# Patient Record
Sex: FEMALE | Race: WHITE | Employment: OTHER | URBAN - METROPOLITAN AREA
[De-identification: names, ages, dates, MRNs, and addresses within clinical notes are randomized per-mention and may not be internally consistent; named-entity substitution may affect disease eponyms.]

---

## 2019-06-20 PROCEDURE — 87624 HPV HI-RISK TYP POOLED RSLT: CPT | Performed by: OBSTETRICS & GYNECOLOGY

## 2019-06-20 PROCEDURE — 88175 CYTOPATH C/V AUTO FLUID REDO: CPT | Performed by: OBSTETRICS & GYNECOLOGY

## 2021-04-15 PROCEDURE — 88305 TISSUE EXAM BY PATHOLOGIST: CPT | Performed by: INTERNAL MEDICINE

## 2021-11-22 PROBLEM — M62.838 SPASM OF RIGHT PIRIFORMIS MUSCLE: Status: ACTIVE | Noted: 2021-11-22

## 2025-06-20 RX ORDER — PROGESTERONE 100 MG/1
100 CAPSULE ORAL NIGHTLY
COMMUNITY

## 2025-06-20 RX ORDER — UBIDECARENONE/VIT E/VIT E MIX 100-20-15
400 CAPSULE ORAL DAILY
COMMUNITY

## 2025-07-14 ENCOUNTER — EKG ENCOUNTER (OUTPATIENT)
Dept: LAB | Age: 75
End: 2025-07-14
Attending: ORTHOPAEDIC SURGERY
Payer: COMMERCIAL

## 2025-07-14 ENCOUNTER — LABORATORY ENCOUNTER (OUTPATIENT)
Dept: LAB | Age: 75
End: 2025-07-14
Attending: ORTHOPAEDIC SURGERY
Payer: COMMERCIAL

## 2025-07-14 DIAGNOSIS — M16.11 OSTEOARTHRITIS OF RIGHT HIP: ICD-10-CM

## 2025-07-14 LAB
ANION GAP SERPL CALC-SCNC: 8 MMOL/L (ref 0–18)
ANTIBODY SCREEN: NEGATIVE
ATRIAL RATE: 75 BPM
BUN BLD-MCNC: 15 MG/DL (ref 9–23)
CALCIUM BLD-MCNC: 9.8 MG/DL (ref 8.7–10.6)
CHLORIDE SERPL-SCNC: 101 MMOL/L (ref 98–112)
CO2 SERPL-SCNC: 32 MMOL/L (ref 21–32)
CREAT BLD-MCNC: 0.81 MG/DL (ref 0.55–1.02)
EGFRCR SERPLBLD CKD-EPI 2021: 76 ML/MIN/1.73M2 (ref 60–?)
ERYTHROCYTE [DISTWIDTH] IN BLOOD BY AUTOMATED COUNT: 14.8 %
GLUCOSE BLD-MCNC: 97 MG/DL (ref 70–99)
HCT VFR BLD AUTO: 40.6 % (ref 35–48)
HGB BLD-MCNC: 13.7 G/DL (ref 12–16)
MCH RBC QN AUTO: 28.1 PG (ref 26–34)
MCHC RBC AUTO-ENTMCNC: 33.7 G/DL (ref 31–37)
MCV RBC AUTO: 83.2 FL (ref 80–100)
OSMOLALITY SERPL CALC.SUM OF ELEC: 293 MOSM/KG (ref 275–295)
P AXIS: 55 DEGREES
P-R INTERVAL: 182 MS
PLATELET # BLD AUTO: 234 10(3)UL (ref 150–450)
POTASSIUM SERPL-SCNC: 3.4 MMOL/L (ref 3.5–5.1)
Q-T INTERVAL: 386 MS
QRS DURATION: 84 MS
QTC CALCULATION (BEZET): 431 MS
R AXIS: -14 DEGREES
RBC # BLD AUTO: 4.88 X10(6)UL (ref 3.8–5.3)
RH BLOOD TYPE: POSITIVE
SODIUM SERPL-SCNC: 141 MMOL/L (ref 136–145)
T AXIS: 18 DEGREES
VENTRICULAR RATE: 75 BPM
WBC # BLD AUTO: 8.3 X10(3) UL (ref 4–11)

## 2025-07-14 PROCEDURE — 93010 ELECTROCARDIOGRAM REPORT: CPT | Performed by: INTERNAL MEDICINE

## 2025-07-14 PROCEDURE — 87081 CULTURE SCREEN ONLY: CPT

## 2025-07-14 PROCEDURE — 36415 COLL VENOUS BLD VENIPUNCTURE: CPT

## 2025-07-14 PROCEDURE — 80048 BASIC METABOLIC PNL TOTAL CA: CPT

## 2025-07-14 PROCEDURE — 85027 COMPLETE CBC AUTOMATED: CPT

## 2025-07-14 PROCEDURE — 86900 BLOOD TYPING SEROLOGIC ABO: CPT

## 2025-07-14 PROCEDURE — 86850 RBC ANTIBODY SCREEN: CPT

## 2025-07-14 PROCEDURE — 86901 BLOOD TYPING SEROLOGIC RH(D): CPT

## 2025-07-14 PROCEDURE — 93005 ELECTROCARDIOGRAM TRACING: CPT

## 2025-07-18 NOTE — H&P (VIEW-ONLY)
Patient ID: Shayna Owens     Chief Complaint:    Right hip pain    HPI:    Shayna Owens is a 75 year old female who presents today for right hip pain. Patient states pain started years ago and worse in the last year. Pain is severe and described as aching, grinding, and sharp, and inability to walk and groin pain. Pain is located in a C around the hip. Factors that aggravate symptoms include: getting in and out of car, putting shoes and socks on activity, stair climbing, getting up from a chair.  Weight Bearing: without asssit.  Patient denies any numbness, tingles, or radicular symptoms. Patient denies any signs of infection including fever or chills.  Patient states that the symptoms are getting progressively worse.  The pain is affecting their quality of life, ability to sleep at night, ability to perform activities and daily living and to ambulate.  Pain with walking and stair climbing.  They report they do walk with a limp.   They do have some low back pain.  Patient does note stiffness in the hip.  Patient has taken an anti-inflammatory for months. She has tried formal PT and acupuncture as well with no relief.  Patient does not walk with a cane.  Patient does not take pain medications. Pain has continued and here to discuss surgical options.         Physical Exam:     Vital Signs:  There were no vitals taken for this visit.    Past Medical History:   Diagnosis Date   • Agatston coronary artery calcium score less than 100 2013    2013 score 12; 2015 score 19   • Degenerative joint disease (DJD) of lumbar spine    • Family history of colon cancer     mother age 67   • Hip pain, right 12/23/2013    resolved after back surgery   • History of adenomatous polyp of colon 2002   • History of spinal fusion 2016    Decreased sensation right leg   • Hyperlipidemia    • Hypertension    • Injury of left rotator cuff 2023 05/17/2023   • Osteoarthritis of left knee 05/27/2025   • Other age-related cataract  05/17/2023   • Primary osteoarthritis of right hip 07/02/2025   • Pyriformis syndrome, right 05/22/2024   • Seafood allergy 05/22/2024    Carries epipen and Benadryl     • Spasm of right piriformis muscle; 11/2021 11/22/2021   • Visual impairment      Past Surgical History:   Procedure Laterality Date   • BACK SURGERY  1/7/16    L4-S1 TLIF    • COLONOSCOPY FLX DX W/COLLJ SPEC WHEN PFRMD  7/8/05    hemorrhoids   • COLONOSCOPY FLX DX W/COLLJ SPEC WHEN PFRMD  2002    adenomatous polyp   • COLONOSCOPY FLX DX W/COLLJ SPEC WHEN PFRMD  12/17/15    Hyperplastic polyp   • COLONOSCOPY FLX DX W/COLLJ SPEC WHEN PFRMD  04/15/2021    ascending polyp   • COLONOSCOPY W/BIOPSY SINGLE/MULTIPLE  10/21/2010    hyperplastic polyp, Performed by ZACHARIAH ASHFORD at [facility]   • COLONOSCOPY W/BIOPSY SINGLE/MULTIPLE N/A 12/17/2015    Procedure: COLONOSCOPY, POSSIBLE BIOPSY, POSSIBLE POLYPECTOMY 13344;  Surgeon: Zachariah Ashford MD;  Location: [facility]   • CYST ASPIRATION LEFT  1990's   • OTHER SURGICAL HISTORY  1992    cyst aspiration on left breast     Current Outpatient Medications   Medication Sig Dispense Refill   • Lovastatin 20 MG Oral Tab Take 1 tablet (20 mg total) by mouth nightly. 90 tablet 3   • Potassium Chloride ER 20 MEQ Oral Tab CR Take 1 tablet by mouth daily. 90 tablet 3   • Irbesartan 150 MG Oral Tab Take 1 tablet (150 mg total) by mouth 2 (two) times daily. 180 tablet 3   • hydroCHLOROthiazide 50 MG Oral Tab Take 1 tablet (50 mg total) by mouth daily. 90 tablet 3   • EPINEPHrine (EPIPEN 2-ALBERTO) 0.3 MG/0.3ML Injection Solution Auto-injector As directed 2 each 2   • PROGESTERONE 100 MG Oral Cap TAKE 1 CAPSULE BY MOUTH NIGHTLY 90 capsule 3   • estradiol 0.05 MG/24HR Transdermal Patch Biweekly Place 1 patch onto the skin twice a week. 24 patch 3   • benzonatate 200 MG Oral Cap Take 1 capsule (200 mg total) by mouth 3 (three) times daily as needed. 30 capsule 2   • Scopolamine 1.5mg TD patch  1mg/3days Place 1 patch onto the skin every third day. 4 patch 1   • meclizine 25 MG Oral Tab Take 1 tablet (25 mg total) by mouth every 6 (six) hours as needed. 20 tablet 0   • olopatadine 0.1 % Ophthalmic Solution      • ALREX 0.2 % Ophthalmic Suspension      • Loratadine (CLARITIN OR) Take 1 tablet by mouth daily. CLARITIN     • Flaxseed, Linseed, (FLAX SEED OIL) 1000 MG Oral Cap Take 1 capsule by mouth daily.     • VITAMIN D 1000 UNIT OR TABS Take by mouth.     • MULTI-VITAMIN/MINERALS OR TABS          Pcn [Bicillin L-A]      RASH  Propoxyphene Hcl        HALLUCINATION  Fish-Derived Produc*    HIVES  Levaquin [Levofloxa*        Comment:Has had knee pain and elbow pain  Lotensin [Benazepri*        Comment:TROUBLE SWALLOWING  Seafood                     Comment:HEAD CONGESTION,TROUBLE SWALLOWING  Shellfish Allergy       UNKNOWN    Comment:HEAD CONGESTION,TROUBLE SWALLOWING  Clarithromycin          RASH  Social History    Tobacco Use      Smoking status: Never      Smokeless tobacco: Never    Alcohol use: Yes      Comment: minimal    Family History   Problem Relation Age of Onset   • Colon Cancer Mother    • Heart Attack Father    • Other (MVP) Father    • High Cholesterol Sister    • Diabetes Maternal Aunt    • Other (Retinitis pigmentosa) Maternal Aunt    • Colon Cancer Paternal Aunt    • Colon Cancer Paternal Cousin Female        ROS:     All other pertinent positives and negatives as noted above in HPI.    Physical Exam  Vital Signs:  There were no vitals taken for this visit.  Estimated body mass index is 38.08 kg/m² as calculated from the following:    Height as of 7/2/25: 5' (1.524 m).    Weight as of 7/2/25: 195 lb (88.5 kg).    antalgic gait  without assistive device    Right hip: Limited internal (5 degrees) and external rotation (15 degrees) with pain in the groin region, Limited abduction (0-15/20) degrees, adduction (15 degrees) and flexion ( -5-95 degrees)         5/5 strength  5/5 knee flexion and  extension  5/5 ankle dorsiflexion and plantarflexion  Sensation grossly intact in thigh, leg and foot  Negative Trendelenburg sign  Negative Stinchfield sign  2+ pedal pulses and brisk capillary refill  No skin rashes or lesion noted    Left hip: Limited internal (20 degrees) and external rotation (30 degrees) with no pain in the groin region, Limited abduction (0-15/20) degrees, adduction (15 degrees) and flexion ( -5-95 degrees)      5/5 strength  5/5 knee flexion and extension  5/5 ankle dorsiflexion and plantarflexion  Sensation grossly intact in thigh, leg and foot  Negative Trendelenburg sign  Negative Stinchfield sign  2+ pedal pulses and brisk capillary refill  No skin rashes or lesions noted    Back:  Deformity: no  Pelvic obliquity: no  Tenderness: no        Radiographs:    Imaging was personally and individually reviewed and discussed at length with the patient:      Two views of the right hip(s) were reviewed in the office today, including AP pelvis and lateral views.  There is severe joint space narrowing (bone-on-bone) with large osteophyte formation off the acetabulum and the head neck junction.  There is sclerosis noted in the femoral head and acetabulum. There is no fracture or dislocation.  No periosteal reactions or medullary lesions are seen.      Hgb: 13.7  Cr: .81  GFR: 76  MSSA   MRSA: negative          Assessment:     right hip osteoarthritis            Plan:       Continuation of conservative management vs. MIGUEL discussed.  The patient wishes to proceed with right total hip replacement.      One or more of the conservative treatments have been tried and failed for three months or more except in special circumstances where delay of definitive care is not appropriate: non steroid anti-infammatory medication(s), physical therapy, and activity modification, but steroid unlikely to provide any sustained relief give severity.      Risk and benefits of surgery were reviewed.  Including, but not  limited to, blood clots, anesthesia risk, infection, leg length discrepancy, failure of the implant, need for future surgeries, continued pain, hematoma, need for transfusion, and death, among others.  The patient understands and wishes to proceed.     The spectrum of treatment options were discussed with the patient in detail including both the nonoperative and operative treatment modalities and their respective risks and benefits.  After thorough discussion, the patient has elected to undergo surgical treatment.  The details of the surgical procedure were explained including the location of probable incisions and a description of the likely implants to be used.  Models and diagrams were used as educational resources. The patient understands the likely convalescence after surgery, as well as the rehabilitation required.  We thoroughly discussed the risks, benefits, and alternatives to surgery.  The risks include but are not limited to the risk of infection, joint stiffness, blood clots (including DVT and/or pulmonary embolus along with the risk of death), neurologic and/or vascular injury, fracture, dislocation, nonunion, malunion, need for further surgery including hardware failure requiring revision, and continued pain.  It was explained that if tissue has been repaired or reconstructed, there is also a chance of failure which may require further management.  In addition, we have discussed the risks, including the risk of disease transmission, associated with any allograft tissue which may be utilized.  Following the completion of the discussion, the patient expressed understanding of this planned course of care, all their questions were answered and consent will be obtained preoperatively.    The risks, benefits and alternatives of surgery were discussed with the patient including, but not limited to:   You may be allergic to the medicine that you get during surgery.   The nerves or tendons around your hip may  be hurt during surgery.   You may bleed more than expected, requiring blood transfusion.  You may get an infection which will require additional surgery and possibly removal of all implants to cure. Patients with diabetes or who are on certain medications to suppress the immune system are particularly at risk for infection. Patients over 40 BMI are at significant risk for infection and wound healing problems.  You could have a fracture during surgery, or within several weeks after surgery. Patients with osteopenia or osteoporosis are at increased risk for fracture during and after surgery.  You may have problems with your heart and/or kidneys. Patients with history of heart disease are at increased risk for cardiac complications and some of the medications used during and after surgery may affect kidney function.  After surgery, your hip may be stiff and painful. Your hip and knee may swell. This usually lasts about 6 weeks and may be permanent.  Your legs may not be the same length.   Your implant may get loose or move out of place causing pain.  The implant may get worn out over time and need to be replaced.   After having surgery, you may lose your balance and be more likely to fall for a time.   You can dislocate your hip which would require an additional procedure and potentially surgery to correct.  You may get a blood clot in your leg or arm. This can cause pain and swelling, and it can stop blood from flowing where it needs to go in your body. The blood clot can break loose and travel to your lungs or brain. A blood clot in your lungs can cause chest pain, trouble breathing and possibly death. A blood clot in your brain can cause a stroke. These problems can be life-threatening.       Pain medication discussed.    R MIGUEL at [facility]    no smoking  no diabetes - Last A1c value was 4.9% done 5/12/2023.  BMI - 39  no VILLA  no hx of infections/MRSA/dental/joint  no hx of blood clots   no blood thinner  yes lumbar  surgery L4-S1 fusion  PCN allergy - rash  No Cardiac history  No Pulmonary history       - risks, benefits and alternatives discussed  - labs reviewed and meds given   - proceed with right total hip arthroplasty    Diagnoses and all orders for this visit:    Primary osteoarthritis of right hip

## 2025-07-19 ENCOUNTER — ANESTHESIA EVENT (OUTPATIENT)
Dept: SURGERY | Facility: HOSPITAL | Age: 75
End: 2025-07-19
Payer: COMMERCIAL

## 2025-07-23 NOTE — DISCHARGE INSTRUCTIONS
Sometimes managing your health at home requires assistance.  The [facility] team has recognized your preference to use [facility].  They can be reached by phone at [phone].  The fax number for your reference is [phone].  A representative from the home health agency will contact you or your family to schedule your first visit.     Weight bearing restrictions:    Weight bearing as tolerated (WBAT) ? you may put as much of your weight on your leg as you can tolerate. This means you may use a walker, crutches, cane- or no device at all to get around.      Bathing  No tub baths, pools, or saunas until cleared by surgeon (about 4-6 weeks because it takes that long for the incision(s) on the skin to heal and be a barrier to prevent infection.)    When allowed to shower:  AQUACEL dressing is waterproof and does not require being covered before showering.  Pat dressing(s) and surrounding skin dry after shower.   There may be one incision or a few that have a dressing.                                      AQUACEL           MEDIPORE/COVERLET           DRY STERILE GAUZE                                                                                                                         MEDIPORE/COVERLET dressing and dry sterile gauze are NOT waterproof and REQUIRE being covered with a waterproof barrier to keep the dressing and incision dry.  SARAN WRAP, GLAD WRAP, PRESS N SEAL WORK REALLY WELL BUT ANY PLASTIC WRAP WILL DO.  Do not wash incision.   Remove entire wrapping and old dressing (if Medipore/coverlet or gauze) after showering. Pat dry with a CLEAN TOWEL if necessary and cover incision with new Medipore/coverlet or gauze.    Incision care/Dressing changes  Wash hands before and after dressing changes.  There may be one or a few dressings on the hip/leg    FOR MEDIPORE/COVERLET DRESSINGS:  Change dressing daily using Medipore/coverlet once Aquacel (waterproof) dressing is removed  (which is about 7 days after surgery). Patient should be standing or lying flat so dressing goes on smoothly.  (This dressing needed for hip patients because of location of incision-don’t want contamination from bathroom use)  FOR DRY STERILE GAUZE DRESSINGS:   Change dressing daily using dry sterile gauze and paper tape.  Watch ALL incision for any redness, drainage, increased warmth or opening of the incision.   Call surgeon if you notice any of these.  No lotions or ointments on or near incision(s).                             DRY STERILE GAUZE                         MEDIPORE /COVERLET    Continue dressing changes until your first visit with your surgeon’s office.  There could be a small amount of redness around the staples or incision; this is normal.  Watch for increased redness, warmth, any odor, increased drainage or opening of the incision. A little clear yellow or blood tinged drainage is normal up to 2 weeks after surgery but it should be less every day until it stops.  Call physician if you notice any concerning changes.  Sutures/staples will be removed at first office visit (10 days- 3 weeks).       Driving  Do not drive until cleared by surgeon. Possibly six weeks after surgery. Discuss this at follow-up office visit.   Not allowed while taking narcotic pain medication or muscle relaxants.    Sex  Usually allowed after six weeks - check with surgeon at your office visit.    Return to work  Usually allowed after six weeks. Discuss specific work activities with your surgeon.    Restrictions  Follow instructions provided by physical therapy    No smoking  Avoid smoking. It is known to cause breathing problems and can decrease the rate of healing.                      Medication  Anticoagulants = blood thinners (Xarelto, Eliquis, Lovenox, Coumadin or Aspirin)  Pill or shot form depending on what your physician orders.   IF placed on Coumadin, you may also need lab work done for monitoring.  You will bleed  easier and bruise easier while on these medications.   Usually you will be on a blood thinner for about 4-5 weeks after surgery.  Contact your physician if you have signs of bruising, nose bleeds or blood in your urine. Use electric razors and soft toothbrushes only.  Do not take NSAIDS (non-steroidal anti-inflammatory medications) while taking blood thinners unless ordered by your surgeon.  Review anticoagulant education information sheet provided.    Discomfort  Surgical discomfort is normal for one to two months.  Have realistic goals and keep a positive outlook.  Keep pain manageable; pain should not disrupt your sleep or activities like getting out of bed or walking.  You may need pain medication regularly (every 4-6 hours) the first 2 weeks and then begin to decrease how often you are taking it.  Take pain medication as prescribed with food, especially before therapy, allowing 30-60 minutes to take effect.  Do not drink alcohol while on pain medication.  As you have less discomfort, decrease the amount of pain medication you take. Use plain Tylenol (acetaminophen) for less severe pain.  Some pain medications have Tylenol (acetaminophen) in them such as Norco and Percocet. Do NOT take Tylenol (acetaminophen) within 4 hours of a dose of these medications.  Apply ice or cold therapy to surgical site for 20 minutes at least four times a day, especially after therapy. Be sure there is a thin cloth barrier between skin and ice or cold therapy.  Change position at least every 45 minutes while awake to avoid stiffness or increased discomfort.  Deep breathing and relaxation techniques and distractions can help!  If you focus on something else, you do not experience the pain the same. Take advantage of everything available to your to help control you discomfort.  Contact physician if discomfort does not respond to pain medication.    Body changes  Constipation is common with the use of narcotics.  Eat fiber rich foods  and drink plenty of fluids, at least 4-6 glasses of water a day, unless restricted.  To prevent constipation, use stool softeners such as Colace and laxatives such as Miralax, Senakot or Milk of Magnesia while taking laxatives.   An enema or suppository may be needed if above measures do not work.    Prevention of infection and promotion of healing  Good hand washing is important. Everyone should wash their hands or use hand  as soon as they walk in your house-whether they live there or are visiting.  Keep bed linen/clothing freshly laundered.  Do not allow others or pets to touch your incision.  Avoid people that have colds or the flu.  Your surgeon may recommend that you take antibiotics before you undergo any dental or other invasive surgical procedures after your hip fracture surgery. Speak with your surgeon about this at your post-op office visit.  Continue using incentive spirometry because narcotics make you sleepy so you may not take good deep breaths. We do not want you to get pneumonia.     Post op Office visits  Schedule 10 days to 3 weeks after surgery WITH SURGEON’s office.  Additional visits may need to be scheduled. Your physician will discuss this at first post-op office visit.  Schedule outpatient physical therapy per your surgeon’s orders.  Schedule one week follow up after surgery WITH PRIMARY CARE PHYSICIAN; review your medications over last 6 months.  Your body gets stressed by surgery and that stress can affect all your other health issues (such as high blood pressure, diabetes, CHF, afib, and asthma just to name a few).  It is much better to catch developing problems and prevent them from becoming larger ones.  BALJINDER HOSE - IF ordered by your surgeon, wear these during the day and off at night.  Surgeon will tell you when you don’t need them anymore.    Notify your surgeon if you notice any of the following signs  Separation of incision line.  Increased redness, swelling, or warmth of  skin around incision.  Increased or foul smelling drainage from incision  Red streaks on skin near incision.  Temperature >100.4F.  Increased pain at incision not relieved by pain medication.    Signs of Possible Hip Dislocation IF Total Hip Replacement or Andrei-arthroplasty Done  Increased severe leg or groin pain  Turning in or out of surgical leg that is new  Increased numbness, tingling to leg  Inability to walk or put weight on your surgical leg  Signs of blood clot  Pain, excessive tenderness, redness, or swelling in leg or calf (other than incision site).  Call physician and await their directions.    Go directly to the ER or CALL 911 if you:  become short of breath  have chest pain  cough up blood  have unexplained anxiety with breathing     Traveling and Handicapped parking  Check with you surgeon when you are allowed to travel so you don’t set yourself up for greater chance of complications.  If traveling by car, get out to stretch every 2 hours.  This helps prevent stiffness.  You may need to do this any time you travel for the first year after surgery.  If traveling by plane, BEFORE you get into a security line, let them know that you had your hip replaced, as you will most likely set off the metal detector. The doctors no longer provide an identification card for this as they are easily copied. ALSO request a wheelchair the first year to board and get off a plane…this aids in priority seating and you should sit on the aisle or at the bulkhead where you can easily stretch your legs and get up to walk up and down the aisles…this helps prevent blood clots and stiffness.  TEMPORARY HANDICAP PARKING APPLICATION  (good for 3-6 months)  - At Surgeon or PCP visit, request they fill out their part of the form. You fill our your portion, then go to Department of [employer]. Some [facility] offices provide the same service. ([place] and [place] have this service; if you live in another [facility], you  may check with them as well). You need space to open car doors to position yourself properly with walker to get in and out of your car safely; some parking spaces are  practically on top of each other and do not give you enough room.    Spanda- hip replacement(single layer compression sleeve):     All patients should wear the compression sleeve until first follow up visit to surgeon’s office (about 2-3 weeks) and then check with surgeon if need to continue use.  Take off to shower. Best to keep on as much as possible, even at night, except when washing out.  Wash with mild soap and water; DRIP dry overnight- put back on before getting up for the day.  Use one long tube on the calf.   It should end up just below the back of the knee and the other end on the ball of the foot to expose the toes.   Makes sure it is NOT bunched up anywhere.  IF the Spanda- feels TOO tight, then REMOVE it and call your surgeon to let them know.

## 2025-07-24 ENCOUNTER — HOSPITAL ENCOUNTER (OUTPATIENT)
Facility: HOSPITAL | Age: 75
Discharge: HOME HEALTH | End: 2025-07-25
Attending: ORTHOPAEDIC SURGERY | Admitting: ORTHOPAEDIC SURGERY

## 2025-07-24 ENCOUNTER — APPOINTMENT (OUTPATIENT)
Dept: GENERAL RADIOLOGY | Facility: HOSPITAL | Age: 75
End: 2025-07-24
Attending: ORTHOPAEDIC SURGERY

## 2025-07-24 ENCOUNTER — ANESTHESIA (OUTPATIENT)
Dept: SURGERY | Facility: HOSPITAL | Age: 75
End: 2025-07-24
Payer: COMMERCIAL

## 2025-07-24 DIAGNOSIS — M16.11 OSTEOARTHRITIS OF RIGHT HIP: Primary | ICD-10-CM

## 2025-07-24 LAB — RH BLOOD TYPE: POSITIVE

## 2025-07-24 PROCEDURE — 97116 GAIT TRAINING THERAPY: CPT

## 2025-07-24 PROCEDURE — 88311 DECALCIFY TISSUE: CPT | Performed by: ORTHOPAEDIC SURGERY

## 2025-07-24 PROCEDURE — 97161 PT EVAL LOW COMPLEX 20 MIN: CPT

## 2025-07-24 PROCEDURE — 88304 TISSUE EXAM BY PATHOLOGIST: CPT | Performed by: ORTHOPAEDIC SURGERY

## 2025-07-24 PROCEDURE — 76000 FLUOROSCOPY <1 HR PHYS/QHP: CPT | Performed by: ORTHOPAEDIC SURGERY

## 2025-07-24 PROCEDURE — 72170 X-RAY EXAM OF PELVIS: CPT | Performed by: ORTHOPAEDIC SURGERY

## 2025-07-24 DEVICE — IMPLANTABLE DEVICE: Type: IMPLANTABLE DEVICE | Site: HIP | Status: FUNCTIONAL

## 2025-07-24 RX ORDER — DIPHENHYDRAMINE HYDROCHLORIDE 50 MG/ML
25 INJECTION, SOLUTION INTRAMUSCULAR; INTRAVENOUS ONCE AS NEEDED
Status: ACTIVE | OUTPATIENT
Start: 2025-07-24 | End: 2025-07-24

## 2025-07-24 RX ORDER — FERROUS SULFATE 325(65) MG
325 TABLET, DELAYED RELEASE (ENTERIC COATED) ORAL
Status: DISCONTINUED | OUTPATIENT
Start: 2025-07-25 | End: 2025-07-25

## 2025-07-24 RX ORDER — PHENYLEPHRINE HCL 10 MG/ML
VIAL (ML) INJECTION AS NEEDED
Status: DISCONTINUED | OUTPATIENT
Start: 2025-07-24 | End: 2025-07-24 | Stop reason: SURG

## 2025-07-24 RX ORDER — HYDROMORPHONE HYDROCHLORIDE 1 MG/ML
0.4 INJECTION, SOLUTION INTRAMUSCULAR; INTRAVENOUS; SUBCUTANEOUS EVERY 2 HOUR PRN
Status: DISCONTINUED | OUTPATIENT
Start: 2025-07-24 | End: 2025-07-25

## 2025-07-24 RX ORDER — FAMOTIDINE 10 MG/ML
20 INJECTION, SOLUTION INTRAVENOUS 2 TIMES DAILY
Status: DISCONTINUED | OUTPATIENT
Start: 2025-07-24 | End: 2025-07-25

## 2025-07-24 RX ORDER — GLYCOPYRROLATE 0.2 MG/ML
INJECTION, SOLUTION INTRAMUSCULAR; INTRAVENOUS AS NEEDED
Status: DISCONTINUED | OUTPATIENT
Start: 2025-07-24 | End: 2025-07-24 | Stop reason: SURG

## 2025-07-24 RX ORDER — SENNOSIDES 8.6 MG
17.2 TABLET ORAL NIGHTLY
Status: DISCONTINUED | OUTPATIENT
Start: 2025-07-24 | End: 2025-07-25

## 2025-07-24 RX ORDER — TRAMADOL HYDROCHLORIDE 50 MG/1
50 TABLET ORAL EVERY 6 HOURS SCHEDULED
Status: DISCONTINUED | OUTPATIENT
Start: 2025-07-24 | End: 2025-07-25

## 2025-07-24 RX ORDER — SODIUM CHLORIDE, SODIUM LACTATE, POTASSIUM CHLORIDE, CALCIUM CHLORIDE 600; 310; 30; 20 MG/100ML; MG/100ML; MG/100ML; MG/100ML
INJECTION, SOLUTION INTRAVENOUS CONTINUOUS
Status: DISCONTINUED | OUTPATIENT
Start: 2025-07-24 | End: 2025-07-25

## 2025-07-24 RX ORDER — DIPHENHYDRAMINE HYDROCHLORIDE 50 MG/ML
12.5 INJECTION, SOLUTION INTRAMUSCULAR; INTRAVENOUS EVERY 4 HOURS PRN
Status: DISCONTINUED | OUTPATIENT
Start: 2025-07-24 | End: 2025-07-25

## 2025-07-24 RX ORDER — PRAVASTATIN SODIUM 20 MG
20 TABLET ORAL NIGHTLY
Status: DISCONTINUED | OUTPATIENT
Start: 2025-07-24 | End: 2025-07-25

## 2025-07-24 RX ORDER — METOCLOPRAMIDE HYDROCHLORIDE 5 MG/ML
INJECTION INTRAMUSCULAR; INTRAVENOUS
Status: COMPLETED
Start: 2025-07-24 | End: 2025-07-24

## 2025-07-24 RX ORDER — ACETAMINOPHEN 325 MG/1
650 TABLET ORAL
Status: DISCONTINUED | OUTPATIENT
Start: 2025-07-24 | End: 2025-07-25

## 2025-07-24 RX ORDER — HYDROMORPHONE HYDROCHLORIDE 1 MG/ML
0.4 INJECTION, SOLUTION INTRAMUSCULAR; INTRAVENOUS; SUBCUTANEOUS EVERY 5 MIN PRN
Status: DISCONTINUED | OUTPATIENT
Start: 2025-07-24 | End: 2025-07-24 | Stop reason: HOSPADM

## 2025-07-24 RX ORDER — DOCUSATE SODIUM 100 MG/1
100 CAPSULE, LIQUID FILLED ORAL 2 TIMES DAILY
Status: DISCONTINUED | OUTPATIENT
Start: 2025-07-24 | End: 2025-07-25

## 2025-07-24 RX ORDER — HYDROCHLOROTHIAZIDE 25 MG/1
50 TABLET ORAL DAILY
Status: DISCONTINUED | OUTPATIENT
Start: 2025-07-25 | End: 2025-07-25

## 2025-07-24 RX ORDER — LOSARTAN POTASSIUM 50 MG/1
50 TABLET ORAL DAILY
Status: DISCONTINUED | OUTPATIENT
Start: 2025-07-25 | End: 2025-07-25

## 2025-07-24 RX ORDER — NEOSTIGMINE METHYLSULFATE 1 MG/ML
INJECTION INTRAVENOUS AS NEEDED
Status: DISCONTINUED | OUTPATIENT
Start: 2025-07-24 | End: 2025-07-24 | Stop reason: SURG

## 2025-07-24 RX ORDER — DIPHENHYDRAMINE HCL 25 MG
25 CAPSULE ORAL EVERY 4 HOURS PRN
Status: DISCONTINUED | OUTPATIENT
Start: 2025-07-24 | End: 2025-07-25

## 2025-07-24 RX ORDER — ASPIRIN 81 MG/1
81 TABLET ORAL 2 TIMES DAILY
Status: DISCONTINUED | OUTPATIENT
Start: 2025-07-24 | End: 2025-07-25

## 2025-07-24 RX ORDER — PROGESTERONE 100 MG/1
100 CAPSULE ORAL NIGHTLY
Status: DISCONTINUED | OUTPATIENT
Start: 2025-07-24 | End: 2025-07-25

## 2025-07-24 RX ORDER — ROCURONIUM BROMIDE 10 MG/ML
INJECTION, SOLUTION INTRAVENOUS AS NEEDED
Status: DISCONTINUED | OUTPATIENT
Start: 2025-07-24 | End: 2025-07-24 | Stop reason: SURG

## 2025-07-24 RX ORDER — HYDROMORPHONE HYDROCHLORIDE 1 MG/ML
0.2 INJECTION, SOLUTION INTRAMUSCULAR; INTRAVENOUS; SUBCUTANEOUS EVERY 5 MIN PRN
Status: DISCONTINUED | OUTPATIENT
Start: 2025-07-24 | End: 2025-07-24 | Stop reason: HOSPADM

## 2025-07-24 RX ORDER — CELECOXIB 200 MG/1
200 CAPSULE ORAL DAILY
COMMUNITY

## 2025-07-24 RX ORDER — HYDROMORPHONE HYDROCHLORIDE 1 MG/ML
0.2 INJECTION, SOLUTION INTRAMUSCULAR; INTRAVENOUS; SUBCUTANEOUS EVERY 2 HOUR PRN
Status: DISCONTINUED | OUTPATIENT
Start: 2025-07-24 | End: 2025-07-25

## 2025-07-24 RX ORDER — NALOXONE HYDROCHLORIDE 0.4 MG/ML
0.08 INJECTION, SOLUTION INTRAMUSCULAR; INTRAVENOUS; SUBCUTANEOUS AS NEEDED
Status: DISCONTINUED | OUTPATIENT
Start: 2025-07-24 | End: 2025-07-24 | Stop reason: HOSPADM

## 2025-07-24 RX ORDER — LIDOCAINE HYDROCHLORIDE 10 MG/ML
INJECTION, SOLUTION EPIDURAL; INFILTRATION; INTRACAUDAL; PERINEURAL AS NEEDED
Status: DISCONTINUED | OUTPATIENT
Start: 2025-07-24 | End: 2025-07-24 | Stop reason: SURG

## 2025-07-24 RX ORDER — HYDROMORPHONE HYDROCHLORIDE 1 MG/ML
INJECTION, SOLUTION INTRAMUSCULAR; INTRAVENOUS; SUBCUTANEOUS
Status: COMPLETED
Start: 2025-07-24 | End: 2025-07-24

## 2025-07-24 RX ORDER — OXYCODONE HYDROCHLORIDE 5 MG/1
TABLET ORAL EVERY 4 HOURS PRN
COMMUNITY

## 2025-07-24 RX ORDER — METOCLOPRAMIDE HYDROCHLORIDE 5 MG/ML
10 INJECTION INTRAMUSCULAR; INTRAVENOUS EVERY 8 HOURS PRN
Status: DISCONTINUED | OUTPATIENT
Start: 2025-07-24 | End: 2025-07-25

## 2025-07-24 RX ORDER — ACETAMINOPHEN 500 MG
1000 TABLET ORAL ONCE
Status: DISCONTINUED | OUTPATIENT
Start: 2025-07-24 | End: 2025-07-24 | Stop reason: HOSPADM

## 2025-07-24 RX ORDER — PREGABALIN 75 MG/1
75 CAPSULE ORAL DAILY
COMMUNITY

## 2025-07-24 RX ORDER — METOCLOPRAMIDE HYDROCHLORIDE 5 MG/ML
10 INJECTION INTRAMUSCULAR; INTRAVENOUS ONCE
Status: COMPLETED | OUTPATIENT
Start: 2025-07-24 | End: 2025-07-24

## 2025-07-24 RX ORDER — LABETALOL HYDROCHLORIDE 5 MG/ML
5 INJECTION, SOLUTION INTRAVENOUS EVERY 5 MIN PRN
Status: DISCONTINUED | OUTPATIENT
Start: 2025-07-24 | End: 2025-07-24 | Stop reason: HOSPADM

## 2025-07-24 RX ORDER — HYDROMORPHONE HYDROCHLORIDE 1 MG/ML
0.6 INJECTION, SOLUTION INTRAMUSCULAR; INTRAVENOUS; SUBCUTANEOUS EVERY 5 MIN PRN
Status: DISCONTINUED | OUTPATIENT
Start: 2025-07-24 | End: 2025-07-24 | Stop reason: HOSPADM

## 2025-07-24 RX ORDER — TRANEXAMIC ACID 10 MG/ML
1000 INJECTION, SOLUTION INTRAVENOUS ONCE
Status: COMPLETED | OUTPATIENT
Start: 2025-07-24 | End: 2025-07-24

## 2025-07-24 RX ORDER — POLYETHYLENE GLYCOL 3350 17 G/17G
17 POWDER, FOR SOLUTION ORAL DAILY PRN
Status: DISCONTINUED | OUTPATIENT
Start: 2025-07-24 | End: 2025-07-25

## 2025-07-24 RX ORDER — MIDAZOLAM HYDROCHLORIDE 1 MG/ML
INJECTION INTRAMUSCULAR; INTRAVENOUS AS NEEDED
Status: DISCONTINUED | OUTPATIENT
Start: 2025-07-24 | End: 2025-07-24 | Stop reason: SURG

## 2025-07-24 RX ORDER — SODIUM CHLORIDE, SODIUM LACTATE, POTASSIUM CHLORIDE, CALCIUM CHLORIDE 600; 310; 30; 20 MG/100ML; MG/100ML; MG/100ML; MG/100ML
INJECTION, SOLUTION INTRAVENOUS CONTINUOUS
Status: DISCONTINUED | OUTPATIENT
Start: 2025-07-24 | End: 2025-07-24 | Stop reason: HOSPADM

## 2025-07-24 RX ORDER — TRAMADOL HYDROCHLORIDE 50 MG/1
50 TABLET ORAL
COMMUNITY

## 2025-07-24 RX ORDER — AMOXICILLIN 250 MG
1 CAPSULE ORAL DAILY
COMMUNITY

## 2025-07-24 RX ORDER — HYDROCODONE BITARTRATE AND ACETAMINOPHEN 5; 325 MG/1; MG/1
2 TABLET ORAL ONCE AS NEEDED
Status: DISCONTINUED | OUTPATIENT
Start: 2025-07-24 | End: 2025-07-24 | Stop reason: HOSPADM

## 2025-07-24 RX ORDER — DEXAMETHASONE SODIUM PHOSPHATE 4 MG/ML
VIAL (ML) INJECTION AS NEEDED
Status: DISCONTINUED | OUTPATIENT
Start: 2025-07-24 | End: 2025-07-24 | Stop reason: SURG

## 2025-07-24 RX ORDER — ONDANSETRON 2 MG/ML
INJECTION INTRAMUSCULAR; INTRAVENOUS AS NEEDED
Status: DISCONTINUED | OUTPATIENT
Start: 2025-07-24 | End: 2025-07-24 | Stop reason: SURG

## 2025-07-24 RX ORDER — ACETAMINOPHEN 500 MG
1000 TABLET ORAL ONCE AS NEEDED
Status: DISCONTINUED | OUTPATIENT
Start: 2025-07-24 | End: 2025-07-24 | Stop reason: HOSPADM

## 2025-07-24 RX ORDER — ASPIRIN 81 MG/1
81 TABLET ORAL 2 TIMES DAILY
COMMUNITY

## 2025-07-24 RX ORDER — DEXAMETHASONE SODIUM PHOSPHATE 10 MG/ML
8 INJECTION, SOLUTION INTRAMUSCULAR; INTRAVENOUS ONCE
Status: DISCONTINUED | OUTPATIENT
Start: 2025-07-25 | End: 2025-07-25

## 2025-07-24 RX ORDER — ONDANSETRON 2 MG/ML
4 INJECTION INTRAMUSCULAR; INTRAVENOUS EVERY 6 HOURS PRN
Status: DISCONTINUED | OUTPATIENT
Start: 2025-07-24 | End: 2025-07-25

## 2025-07-24 RX ORDER — HYDROCODONE BITARTRATE AND ACETAMINOPHEN 5; 325 MG/1; MG/1
1 TABLET ORAL ONCE AS NEEDED
Status: DISCONTINUED | OUTPATIENT
Start: 2025-07-24 | End: 2025-07-24 | Stop reason: HOSPADM

## 2025-07-24 RX ORDER — ACETAMINOPHEN 500 MG
1000 TABLET ORAL EVERY 8 HOURS
COMMUNITY

## 2025-07-24 RX ORDER — KETOROLAC TROMETHAMINE 15 MG/ML
15 INJECTION, SOLUTION INTRAMUSCULAR; INTRAVENOUS EVERY 6 HOURS
Status: DISCONTINUED | OUTPATIENT
Start: 2025-07-24 | End: 2025-07-25

## 2025-07-24 RX ORDER — SODIUM PHOSPHATE, DIBASIC AND SODIUM PHOSPHATE, MONOBASIC 7; 19 G/230ML; G/230ML
1 ENEMA RECTAL ONCE AS NEEDED
Status: DISCONTINUED | OUTPATIENT
Start: 2025-07-24 | End: 2025-07-25

## 2025-07-24 RX ORDER — BISACODYL 10 MG
10 SUPPOSITORY, RECTAL RECTAL
Status: DISCONTINUED | OUTPATIENT
Start: 2025-07-24 | End: 2025-07-25

## 2025-07-24 RX ORDER — FAMOTIDINE 20 MG/1
20 TABLET, FILM COATED ORAL 2 TIMES DAILY
Status: DISCONTINUED | OUTPATIENT
Start: 2025-07-24 | End: 2025-07-25

## 2025-07-24 RX ORDER — OXYCODONE HYDROCHLORIDE 5 MG/1
5 TABLET ORAL EVERY 4 HOURS PRN
Status: DISCONTINUED | OUTPATIENT
Start: 2025-07-24 | End: 2025-07-25

## 2025-07-24 RX ADMIN — NEOSTIGMINE METHYLSULFATE 4 MG: 1 INJECTION INTRAVENOUS at 09:28:00

## 2025-07-24 RX ADMIN — SODIUM CHLORIDE, SODIUM LACTATE, POTASSIUM CHLORIDE, CALCIUM CHLORIDE: 600; 310; 30; 20 INJECTION, SOLUTION INTRAVENOUS at 07:04:00

## 2025-07-24 RX ADMIN — DEXAMETHASONE SODIUM PHOSPHATE 4 MG: 4 MG/ML VIAL (ML) INJECTION at 07:20:00

## 2025-07-24 RX ADMIN — PHENYLEPHRINE HCL 100 MCG: 10 MG/ML VIAL (ML) INJECTION at 07:32:00

## 2025-07-24 RX ADMIN — PHENYLEPHRINE HCL 100 MCG: 10 MG/ML VIAL (ML) INJECTION at 08:26:00

## 2025-07-24 RX ADMIN — PHENYLEPHRINE HCL 100 MCG: 10 MG/ML VIAL (ML) INJECTION at 08:43:00

## 2025-07-24 RX ADMIN — TRANEXAMIC ACID 1000 MG: 10 INJECTION, SOLUTION INTRAVENOUS at 07:32:00

## 2025-07-24 RX ADMIN — GLYCOPYRROLATE 0.8 MG: 0.2 INJECTION, SOLUTION INTRAMUSCULAR; INTRAVENOUS at 09:28:00

## 2025-07-24 RX ADMIN — MIDAZOLAM HYDROCHLORIDE 2 MG: 1 INJECTION INTRAMUSCULAR; INTRAVENOUS at 07:09:00

## 2025-07-24 RX ADMIN — SODIUM CHLORIDE, SODIUM LACTATE, POTASSIUM CHLORIDE, CALCIUM CHLORIDE: 600; 310; 30; 20 INJECTION, SOLUTION INTRAVENOUS at 09:40:00

## 2025-07-24 RX ADMIN — ROCURONIUM BROMIDE 50 MG: 10 INJECTION, SOLUTION INTRAVENOUS at 07:09:00

## 2025-07-24 RX ADMIN — PHENYLEPHRINE HCL 100 MCG: 10 MG/ML VIAL (ML) INJECTION at 09:10:00

## 2025-07-24 RX ADMIN — LIDOCAINE HYDROCHLORIDE 50 MG: 10 INJECTION, SOLUTION EPIDURAL; INFILTRATION; INTRACAUDAL; PERINEURAL at 07:09:00

## 2025-07-24 RX ADMIN — ONDANSETRON 4 MG: 2 INJECTION INTRAMUSCULAR; INTRAVENOUS at 09:03:00

## 2025-07-24 RX ADMIN — SODIUM CHLORIDE, SODIUM LACTATE, POTASSIUM CHLORIDE, CALCIUM CHLORIDE: 600; 310; 30; 20 INJECTION, SOLUTION INTRAVENOUS at 09:06:00

## 2025-07-24 NOTE — PLAN OF CARE
Patient is alert and oriented x 4. Vitals stable on room air. Patient reporting mild pain managed by scheduled meds. Denies numbness & tingling. Dressing c/d/I to RLE. Plan for PT/OT. Plan of care discussed with patient.

## 2025-07-24 NOTE — CONSULTS
[facility]   part of [facility]    History & Physical    Shayna Owens Patient Status:  Outpatient in a Bed    1950 MRN [id number]   Location [facility] 3SW-A Attending Thai Penaloza MD   Hosp Day # 0 PCP Adam Manzo MD     Date:  2025  Date of Admission:  2025    Chief Complaint:     - OA  - s/p R MIGUEL    HPI:   Shayna Owens is a(n) 75 year old female w/ PMHx of eHTN, HLD, OA, DJD s/p prior spinal fusion who presented for R MIGUEL. Pt is s/p R anterior MIGUEL w/ Dr. Penaloza.     Post op has some pain but manageable, she reports she mostly just took tylenol after spine surgery     She reports no cp, palps, dyspnea, escobar, orthopnea, le edema, she is ambulating      History   Past Medical History[1]  Past Surgical History[2]  Family History[3]  Social History:  Short Social Hx on File[4]    Allergies/Medications:   Allergies: Allergies[5]  Prescriptions Prior to Admission[6]    Review of Systems:   A comprehensive 12 point review of systems was otherwise negative, aside from what's stated above.    Physical Exam:   Vital Signs:  Blood pressure 144/88, pulse 98, temperature 98.1 °F (36.7 °C), temperature source Oral, resp. rate 16, height 5' (1.524 m), weight 202 lb (91.6 kg), SpO2 94%, not currently breastfeeding.     General: No acute distress. Alert and oriented x 3.  HEENT: Moist mucous membranes. EOM-I. PERRL  Neck: No lymphadenopathy.  No JVD. No carotid bruits.  Respiratory: CTAB, no wheezing   Cardiovascular: RRR, no murmurs   Abdomen: Soft, nontender, nondistended.  Positive bowel sounds. No rebound tenderness  Neurologic: No focal neurological deficits.  Musculoskeletal: right sided dressing c/d/i  Integument: No lesions. No erythema.  Psychiatric: Appropriate mood and affect.    Results:     Lab Results   Component Value Date    WBC 8.3 2025    HGB 13.7 2025    HCT 40.6 2025    .0 2025    CREATSERUM 0.81 2025    BUN 15  07/14/2025     07/14/2025    K 3.4 (L) 07/14/2025     07/14/2025    CO2 32.0 07/14/2025    GLU 97 07/14/2025    CA 9.8 07/14/2025    ALB 4.1 04/08/2021    ALKPHO 55 04/08/2021    BILT 0.49 04/08/2021    TP 6.8 04/08/2021    AST 15 04/08/2021    ALT 11 04/08/2021    PTT 31.7 12/09/2015    INR 0.97 12/09/2015    TSH 2.410 08/08/2014          XR PELVIS (1 VIEW) (CPT=72170)  Result Date: 7/24/2025  CONCLUSION: Postoperative changes of right total hip arthroplasty without regional fracture, malalignment, or evidence of hardware complication. Small volume of regional soft tissue gas noted. Mild osteoarthritis of the left hip. Electronically Verified and Signed by Attending Radiologist: Lucita Marshall MD 7/24/2025 12:03 PM Workstation: [id number]    XR FLUOROSCOPY C-ARM TIME LESS THAN 1 HOUR (CPT=76000)  Result Date: 7/24/2025  CONCLUSION: Multiple fluoroscopic images acquired during right total hip arthroplasty procedure. Please refer to dedicated procedure report for full details. Electronically Verified and Signed by Attending Radiologist: Lucita Marshall MD 7/24/2025 10:01 AM Workstation: [id number]          Assessment/Plan:     Shayna Owens is a(n) 75 year old female w/ PMHx of eHTN, HLD, OA, DJD s/p prior spinal fusion who presented for R MIGUEL. Pt is s/p R anterior MIGUEL w/ Dr. Penaloza.     OA  S/p R MIGUEL w/ Dr. Penaloza   - post op mgmt per ortho     eHTN  - losartan in place of home irbesartan - okay to start tomorrow w/ hold parameters  - cont home thiazide tomorrow   - check chem panel tomorrow  - replace lytes prn     HLD  - statin     DVT ppx: aspirin bid per ortho   Code Status:     Dispo: will follow     Time spent 46min    DO NAOMIE Figueroa Hospitalist                 [1]   Past Medical History:   Agatston coronary artery calcium score less than 100    2013 score 12; 2015 score 19    Degenerative joint disease (DJD) of lumbar spine    Family history of colon cancer    mother age 67     High blood pressure    High cholesterol    Hip pain, right    resolved after back surgery    History of adenomatous polyp of colon    History of spinal fusion    Decreased sensation right leg    Hyperlipidemia    Hypertension    Migraines    ocular migraine    Osteoarthritis    general    Spasm of right piriformis muscle; 11/2021    Visual impairment    prescription glasses   [2]   Past Surgical History:  Procedure Laterality Date    Back surgery  1/7/16    L4-S1 TLIF     Colonoscopy,biopsy  10/21/2010    hyperplastic polyp, Performed by ZACHARIAH ASHFORD at [facility]    Colonoscopy,biopsy N/A 12/17/2015    Procedure: COLONOSCOPY, POSSIBLE BIOPSY, POSSIBLE POLYPECTOMY 83654;  Surgeon: Zachariah Ashford MD;  Location: [facility]    Colonoscopy,diagnostic  7/8/05    hemorrhoids    Colonoscopy,diagnostic  2002    adenomatous polyp    Colonoscopy,diagnostic  12/17/15    Hyperplastic polyp    Colonoscopy,diagnostic  04/15/2021    ascending polyp    Cyst aspiration left  1990's    Other surgical history  1992    cyst aspiration on left breast   [3]   Family History  Problem Relation Age of Onset    Heart Attack Father     Other (MVP) Father     Colon Cancer Mother     High Cholesterol Sister     Colon Cancer Paternal Aunt     Diabetes Maternal Aunt     Colon Cancer Paternal Cousin Female    [4]   Social History  Socioeconomic History    Marital status:    Tobacco Use    Smoking status: Never    Smokeless tobacco: Never   Vaping Use    Vaping status: Never Used   Substance and Sexual Activity    Alcohol use: Yes     Alcohol/week: 0.0 standard drinks of alcohol     Comment: occ. 2 beers a month    Drug use: No    Sexual activity: Yes     Partners: Male   [5]   Allergies  Allergen Reactions    Fish-Derived Products HIVES     Olfactory - gets sick just by smell    Pcn [Bicillin L-A] RASH    Propoxyphene Hcl HALLUCINATION    Levaquin [Levofloxacin]      Has had knee pain and elbow pain     Lotensin [Benazepril Hcl]      TROUBLE SWALLOWING      Seafood OTHER (SEE COMMENTS)     HEAD CONGESTION,TROUBLE SWALLOWING    Olfactory-gets sick just by smell    Shellfish Allergy UNKNOWN     HEAD CONGESTION,TROUBLE SWALLOWING    Clarithromycin RASH   [6]   Medications Prior to Admission   Medication Sig    progesterone 100 MG Oral Cap Take 1 capsule (100 mg total) by mouth nightly.    Turmeric 5-1000 MG Oral Cap Take 2 capsules by mouth daily.    Co-Enzyme Q10 100 MG Oral Cap Take 400 mg by mouth daily.    PATIENT SUPPLIED MEDICATION Calcium 600 mg /takes 2 daily  Magnesium 150 mg/takes 2 daily  Collagen 1 scoop daily    Multiple Vitamins-Minerals (PRESERVISION AREDS 2 OR) Take 2 capsules by mouth daily.    LOVASTATIN 20 MG Oral Tab TAKE 1 TABLET BY MOUTH EVERY NIGHT    Potassium Chloride ER 20 MEQ Oral Tab CR Take 1 tablet by mouth daily. (Patient taking differently: Take 1 tablet by mouth every evening.)    hydroCHLOROthiazide 50 MG Oral Tab Take 1 tablet (50 mg total) by mouth daily.    Irbesartan 150 MG Oral Tab Take 1 tablet (150 mg total) by mouth 2 (two) times daily.    estradiol 0.05 MG/24HR Transdermal Patch Biweekly Place 1 patch onto the skin twice a week.    Loratadine (CLARITIN OR) Take 1 tablet by mouth in the morning.    Flaxseed, Linseed, (FLAX SEED OIL) 1000 MG Oral Cap Take 1 capsule by mouth in the morning.    VITAMIN D 1000 UNIT OR TABS Take 1 tablet by mouth daily.    MULTI-VITAMIN/MINERALS OR TABS Take 1 tablet by mouth daily.    acetaminophen 500 MG Oral Tab Take 2 tablets (1,000 mg total) by mouth every 8 (eight) hours. Post op    aspirin 81 MG Oral Tab EC Take 1 tablet (81 mg total) by mouth in the morning and 1 tablet (81 mg total) before bedtime. Post op. To take for 6 weeks for blood clot prevention.    celecoxib 200 MG Oral Cap Take 1 capsule (200 mg total) by mouth daily. Post op    pregabalin 75 MG Oral Cap Take 1 capsule (75 mg total) by mouth daily. Post op    sennosides-docusate  8.6-50 MG Oral Tab Take 1 tablet by mouth daily. Post op    oxyCODONE 5 MG Oral Tab Take 0.5-1 tablets (2.5-5 mg total) by mouth every 4 (four) hours as needed for Pain. Post op    traMADol 50 MG Oral Tab Take 1 tablet (50 mg total) by mouth every 4 to 6 hours as needed for Pain. Post op    EPINEPHrine (EPIPEN 2-ALBERTO) 0.3 MG/0.3ML Injection Solution Auto-injector As directed

## 2025-07-24 NOTE — INTERVAL H&P NOTE
Pre-op Diagnosis: PRIMARY OSTEOARTHRITIS RIGHT HIP    The above referenced H&P was reviewed by Thai Penaloza MD on 7/24/2025, the patient was examined and no significant changes have occurred in the patient's condition since the H&P was performed.  I discussed with the patient and/or legal representative the potential benefits, risks and side effects of this procedure; the likelihood of the patient achieving goals; and potential problems that might occur during recuperation.  I discussed reasonable alternatives to the procedure, including risks, benefits and side effects related to the alternatives and risks related to not receiving this procedure.  We will proceed with procedure as planned.

## 2025-07-24 NOTE — ANESTHESIA PROCEDURE NOTES
Airway  Date/Time: 7/24/2025 7:12 AM  Reason: elective      General Information and Staff   Patient location during procedure: OR  Anesthesiologist: Kian Slater MD  Performed: anesthesiologist   Performed by: Kian Slater MD  Authorized by: Kian Slater MD        Indications and Patient Condition  Indications for airway management: anesthesia  Sedation level: deep      Preoxygenated: yesPatient position: sniffing    Mask difficulty assessment: 1 - vent by mask    Final Airway Details    Final airway type: endotracheal airway    Successful airway: ETT  Cuffed: yes   Successful intubation technique: direct laryngoscopy  Facilitating devices/methods: cricoid pressure  Endotracheal tube insertion site: oral  Blade: Cullen  Blade size: #4  ETT size (mm): 7.0    Cormack-Lehane Classification: grade IIB - view of arytenoids or posterior of glottis only  Placement verified by: capnometry   Measured from: lips  ETT to lips (cm): 22  Number of attempts at approach: 1

## 2025-07-24 NOTE — CM/SW NOTE
07/24/25 1600   CM/SW Referral Data   Referral Source Physician   Reason for Referral Discharge planning   Informant EMR;Clinical Staff Member     Pt s/p MIGUEL- pre-op plan [facility].    Ethel Drummond LCSW  /Discharge Planner

## 2025-07-24 NOTE — PHYSICAL THERAPY NOTE
PHYSICAL THERAPY JOINT EVALUATION - INPATIENT     Room Number: 373/373-A  Evaluation Date: 7/24/2025  Type of Evaluation: Initial  Physician Order: PT Eval and Treat    Presenting Problem: s/p R MIGUEL 7/24/25  Co-Morbidities : HTN, L knee OA, spinal fusion  Reason for Therapy: Mobility Dysfunction and Discharge Planning    PHYSICAL THERAPY ASSESSMENT   Patient is a 75 year old female admitted 7/24/2025 for scheduled R MIGUEL.  Prior to admission, patient's baseline is MOD I.  Patient is currently functioning near baseline with bed mobility, transfers, and gait.  Patient is requiring contact guard assist as a result of the following impairments: decreased functional strength, impaired standing balance, decreased muscular endurance, and limited R hip ROM.  Physical Therapy will continue to follow for duration of hospitalization.    Patient will benefit from continued skilled PT Services at discharge to promote prior level of function.  Anticipate patient will return home with home health PT.    PLAN DURING HOSPITALIZATION  Nursing Mobility Recommendation : 1 Assist  PT Device Recommendation: Rolling walker  PT Treatment Plan: Bed mobility, Endurance, Energy conservation, Patient education, Gait training, Balance training, Transfer training, Strengthening, Stair training, Range of motion  Rehab Potential : Good  Frequency (Obs): Daily     CURRENT GOALS  Goal #1  Patient is able to demonstrate supine - sit EOB @ level: supervision     Goal #2  Patient is able to demonstrate transfers Sit to/from Stand at assistance level: supervision   Goal #3    Patient is able to ambulate 150 feet with assistive device at assistance level: supervision   Goal #4    Patient will negotiate 4 stairs/one curb w/ assistive device and supervision   Goal #5    Patient verbalizes and/or demonstrates all precautions and safety concerns independently    Goal #6      Goal Comments: Goals established on 7/24/2025      HOME SITUATION  Type of Home:  House   Home Layout: Two level              Lives With: Spouse  Drives: Yes       Prior Level of [place]: Pt lives with spouse in 2 story home. Pts daughter will be staying with her as well. Pt independent with ADL and mobility. Pt cares for young grandchildren.     SUBJECTIVE  \"My left knee isn't good either.\"     OBJECTIVE  Precautions: Bed/chair alarm  Fall Risk: High fall risk    WEIGHT BEARING RESTRICTION  R Lower Extremity: Weight Bearing as Tolerated    PAIN ASSESSMENT  Rating: Unable to rate  Location: R hip  Management Techniques: Activity promotion, Repositioning    COGNITION  Overall Cognitive Status:  WFL - within functional limits    RANGE OF MOTION AND STRENGTH ASSESSMENT  Upper extremity ROM and strength are within functional limits     Lower extremity ROM is within functional limits except limited R hip flex/ext    Lower extremity strength is within functional limits except for the following:    Right Knee extension  3+/5      BALANCE  Static Sitting: Good  Dynamic Sitting: Good  Static Standing: Fair -  Dynamic Standing: Poor +    ADDITIONAL TESTS                                    ACTIVITY TOLERANCE                         O2 WALK       NEUROLOGICAL FINDINGS                        AM-PAC '6-Clicks' INPATIENT SHORT FORM - BASIC MOBILITY  How much difficulty does the patient currently have...  Patient Difficulty: Turning over in bed (including adjusting bedclothes, sheets and blankets)?: A Little   Patient Difficulty: Sitting down on and standing up from a chair with arms (e.g., wheelchair, bedside commode, etc.): A Little   Patient Difficulty: Moving from lying on back to sitting on the side of the bed?: A Little   How much help from another person does the patient currently need...   Help from Another: Moving to and from a bed to a chair (including a wheelchair)?: A Little   Help from Another: Need to walk in hospital room?: A Little   Help from Another: Climbing 3-5 steps with a railing?: A  Almaz       AM-PAC Score:  Raw Score: 18   Approx Degree of Impairment: 46.58%   Standardized Score (AM-PAC Scale): 43.63   CMS Modifier (G-Code): CK    FUNCTIONAL ABILITY STATUS  Gait Assessment   Functional Mobility/Gait Assessment  Gait Assistance: Contact guard assist  Distance (ft): 200  Assistive Device: Rolling walker    Skilled Therapy Provided  Supervision for bed mobility.   VC for transfer set up.   Sit-stand with MIN assist for balance.   Demos good WB on R LE.   VC for gait sequencing.   Ambulatory into bathroom.   Performed toilet transfer with CGA for safety.   Pt ambulatory in hallway with slow timmy, step through gait pattern, and decreased stance time on R LE.   VC for sequencing with RW. Tolerated well.   Returned to sitting up in bedside chair.   Requests to use bathroom again.   Sit-stand from chair with CGA.   Pt ambulatory to bathroom with RW and CGA.   Toilet transfer and ADL at sink with CGA.   Returned to sitting up in bedside chair.   Assisted with rosalie kwong.   Left up in bedside chair.     Bed Mobility:  Rolling: NT  Supine to sit: supervision   Sit to supine: NT     Transfer Mobility:  Sit to stand: MIN   Stand to sit: MIN  Gait = CGA    Therapist's Comments:  Reviewed activity recommendations including RW use.     Exercise/Education Provided:  Bed mobility  Energy conservation  Functional activity tolerated  Gait training  Posture  Strengthening  Lower therapeutic exercise:  Ankle pumps  Glut sets  Quad sets  Transfer training    Patient End of Session: Up in chair, Needs met, Call light within reach, RN aware of session/findings, All patient questions and concerns addressed, Hospital anti-slip socks, Alarm set, SCDs in place, Ice applied    Patient Evaluation Complexity Level:  History Moderate - 1 or 2 personal factors and/or co-morbidities   Examination of body systems Low -  addressing 1-2 elements   Clinical Presentation Low- Stable   Clinical Decision Making Low Complexity        PT Session Time: 30 minutes  Gait Training: 10 minutes  Therapeutic Activity:  minutes  Neuromuscular Re-education:  minutes  Therapeutic Exercise:  minutes

## 2025-07-24 NOTE — OPERATIVE REPORT
OPERATIVE REPORT    Facility:  [facility]    Patient Name:  Shayna Owens    Age/Gender:  75 year old female  :  1950    MRN:  [id number]    Date of Operation:  2025    Preoperative Diagnosis:  RIGHT HIP OSTEOARTHRITIS    Postoperative Diagnosis:  RIGHT HIP OSTEOARTHRITIS    Procedure Performed:  RIGHT TOTAL HIP ARTHROPLASTY    Surgeon:  JAIME ROTH M.D.    Assistant:    Nathalie Escalera, MSN, FNP-BC      Anesthesia:  EPIDURAL    Estimated Blood Loss:  150cc    Drain:  NONE    Complications:  NONE    Implants:   1.  Biomet G7 acetabular shell, size 52 mm   2.  Neutral Vivacit-E highly cross-linked polyethylene liner   3.  Gino Avenir Complete femoral stem, size 4, std offset   4.  36 mm, -3.5 Delta ceramic femoral head      Indications for Procedure:  Shayna Owens is a 75 year old female with osteoarthritis of the right hip who failed conservative management.  After consultation in the office and discussion regarding risks and benefits of surgical treatment, surgery was scheduled and informed consent obtained.      Description of Procedure:  The patient was taken to the operating room after the correct surgical site was marked in the preop holding area.  The patient was placed under anesthesia and placed in a supine position on the [place] orthopaedic table.  Preoperative antibiotics were given.  All bony prominences were padded.  The right hip was prepped and draped in usual, sterile surgical fashion.  Bony landmarks were palpated for incision and a direct anterior approach was performed to the hip between the interval of tensor fascia prince and sartorius/rectus femoris.  Lateral femoral circumflex vessels were identified, isolated and cauterized.  An L-shaped capsulotomy was performed and the capsule was tagged for retraction.  Retractors were placed inside the hip capsule and further capsular release was performed inside the saddle of the femur as well as down the medial  aspect of the femoral neck.  Osteotomy of the femoral neck was performed using a sagittal saw.  A corkscrew was used to remove the femoral head.  Retractors were placed anterior and posterior to the acetabulum.  The hip labrum as well as the soft tissue in the cotyloid fossa were removed in their entirety.  Reaming was then performed using flouroscopy to assess depth, anteversion and abduction angle.  The acetabular shell was then inserted again using flouroscopy to assess abduction angle, anteversion and complete seating of the acetabular component.  A neutral polyethylene was then inserted and impacted without difficulty.    Attention was then turned to the femur where the femoral hook was placed around the femur just distal to the vastus tubercle and proximal to the tendon of the gluteus pennie.  The leg was then externally rotated, extended and adducted with traction completely released.  The mechanical lift arm on the table was used to hold the proximal femur carefully.  Capsule was then further released inside the trochanter until the entire inside of the greater trochanter was easily visualized.  The lateral femoral neck remnant was removed and the femur was broached.  After broaching up to a proper size, trial head and neck components were used to determine stem offset and head length.  Flouroscopy was used to verify the position of the broach as well as the length and offset to determine final implant positions. The hip was taken through a range of motion and noted to be stable in up to 90* (degrees) external rotation along with up to 50 *(degrees) of extension. The trials were removed and permanent femoral components were opened and inserted.  There were no complications with insertion of the femoral components.  The hip was then reduced under direct visualization.  Flouroscopy again verified length, offset, implant position and stem fill of the femoral canal.  Flouro also verified there were no  iatrogenic fractures.  The wound was copiously irrigated (including with dilute betadine solution) and the tag sutures in the capsule were tied together closing the anterior hip capsule.  Local anesthetic and pain medicine was injected into the hip capsule and surrounding soft tissues.  The fascia prince was closed with absorbable suture.  Skin was then closed in 2 layers with absorbable suture.  Dermabond was applied to the wound and allowed to dry before nylon sutures were placed proximally for additional support for soft tissue and then sterile dressings were applied.  There were no complications and the procedure went as planned.  The surgical assistant was present for the entire procedure and assisted with the approach, exposure, definitive surgery and closure.  The patient left the operating room in stable condition.      JAIME ROTH M.D.

## 2025-07-24 NOTE — ANESTHESIA PREPROCEDURE EVALUATION
PRE-OP EVALUATION    Patient Name: Shayna Owens    Admit Diagnosis: PRIMARY OSTEOARTHRITIS RIGHT HIP    Pre-op Diagnosis: PRIMARY OSTEOARTHRITIS RIGHT HIP    RIGHT ANTERIOR TOTAL HIP ARTHROPLASTY    Anesthesia Procedure: RIGHT ANTERIOR TOTAL HIP ARTHROPLASTY (Right)    Surgeons and Role:     * Thai Penaloza MD - Primary    Pre-op vitals reviewed.  Temp: 97.7 °F (36.5 °C)  Pulse: 90  Resp: 16  BP: 153/97     Body mass index is 39.45 kg/m².    Current medications reviewed.  Hospital Medications:  Current Medications[1]    Outpatient Medications:   Prescriptions Prior to Admission[2]    Allergies: Fish-derived products, Pcn [bicillin l-a], Propoxyphene hcl, Levaquin [levofloxacin], Lotensin [benazepril hcl], Seafood, Shellfish allergy, and Clarithromycin      Anesthesia Evaluation    Patient summary reviewed.    Anesthetic Complications  (-) history of anesthetic complications         GI/Hepatic/Renal                                 Cardiovascular        Exercise tolerance: good     MET: >4      (+) hypertension     (-) CAD  (-) past MI  (-) CABG/stent                            Endo/Other      (-) diabetes     (-) hypothyroidism  (-) hyperthyroidism                     Pulmonary    Negative pulmonary ROS.                       Neuro/Psych                                      Past Surgical History[3]  Social Hx on file[4]  History   Drug Use No     Available pre-op labs reviewed.  Lab Results   Component Value Date    WBC 8.3 07/14/2025    RBC 4.88 07/14/2025    HGB 13.7 07/14/2025    HCT 40.6 07/14/2025    MCV 83.2 07/14/2025    MCH 28.1 07/14/2025    MCHC 33.7 07/14/2025    RDW 14.8 07/14/2025    .0 07/14/2025     Lab Results   Component Value Date     07/14/2025    K 3.4 (L) 07/14/2025     07/14/2025    CO2 32.0 07/14/2025    BUN 15 07/14/2025    CREATSERUM 0.81 07/14/2025    GLU 97 07/14/2025    CA 9.8 07/14/2025            Airway      Mallampati: II       Cardiovascular      Rhythm:  regular       Dental    Dentition appears grossly intact         Pulmonary    Pulmonary exam normal.                 Other findings              ASA: 2   Plan: general  NPO status verified and     Post-procedure pain management plan discussed with surgeon and patient.      Plan/risks discussed with: patient            R/B/A were explained including but not limited to risk of aspiration, perioperative cardiac events, lip gum or teeth injury, anaphylactic reaction, corneal abrasion, PONV, sore throat. All questions answered and the patient agreed to proceed.       Present on Admission:  **None**             [1]    acetaminophen (Tylenol Extra Strength) tab 1,000 mg  1,000 mg Oral Once    lactated ringers infusion   Intravenous Continuous    tranexamic acid in sodium chloride 0.7% (Cyklokapron) 1000 mg/100mL infusion premix 1,000 mg  1,000 mg Intravenous Once    ceFAZolin (Ancef) 2g in 10mL IV syringe premix  2 g Intravenous Once    ceFAZolin (Ancef) 2 g/10mL IV syringe premix        povidone-iodine (Betadine) 10 % 17.5 mL in sodium chloride 0.9 % 500 mL irrigation   Irrigation Once (Intra-Op)    clonidine/epinephrine/ropivacaine/ketorolac in 0.9% NaCl 60 mL pain cocktail syringe for hip arthroplasty   Infiltration Once (Intra-Op)   [2]   Medications Prior to Admission   Medication Sig Dispense Refill Last Dose/Taking    progesterone 100 MG Oral Cap Take 1 capsule (100 mg total) by mouth nightly.   7/23/2025 at  7:00 PM    Turmeric 5-1000 MG Oral Cap Take 2 capsules by mouth daily.   7/10/2025    Co-Enzyme Q10 100 MG Oral Cap Take 400 mg by mouth daily.   7/10/2025    PATIENT SUPPLIED MEDICATION Calcium 600 mg /takes 2 daily  Magnesium 150 mg/takes 2 daily  Collagen 1 scoop daily   7/10/2025    Multiple Vitamins-Minerals (PRESERVISION AREDS 2 OR) Take 2 capsules by mouth daily.   7/10/2025    LOVASTATIN 20 MG Oral Tab TAKE 1 TABLET BY MOUTH EVERY NIGHT 90 tablet 1 7/23/2025 at  7:00 PM    Potassium Chloride ER 20 MEQ  Oral Tab CR Take 1 tablet by mouth daily. (Patient taking differently: Take 1 tablet by mouth every evening.) 90 tablet 1 7/23/2025 at  7:00 PM    hydroCHLOROthiazide 50 MG Oral Tab Take 1 tablet (50 mg total) by mouth daily. 90 tablet 1 7/24/2025 at  3:00 AM    Irbesartan 150 MG Oral Tab Take 1 tablet (150 mg total) by mouth 2 (two) times daily. 180 tablet 0 7/23/2025 at  7:00 PM    estradiol 0.05 MG/24HR Transdermal Patch Biweekly Place 1 patch onto the skin twice a week. 24 patch 3 7/23/2025 at  8:00 AM    Loratadine (CLARITIN OR) Take 1 tablet by mouth in the morning.   7/23/2025 at  8:00 AM    Flaxseed, Linseed, (FLAX SEED OIL) 1000 MG Oral Cap Take 1 capsule by mouth in the morning.   7/10/2025    VITAMIN D 1000 UNIT OR TABS Take 1 tablet by mouth daily.   7/10/2025    MULTI-VITAMIN/MINERALS OR TABS Take 1 tablet by mouth daily.   7/10/2025    acetaminophen 500 MG Oral Tab Take 2 tablets (1,000 mg total) by mouth every 8 (eight) hours.       aspirin 81 MG Oral Tab EC Take 1 tablet (81 mg total) by mouth 2 (two) times daily. To take for 6 weeks total for blood clot prevention.       celecoxib 200 MG Oral Cap Take 1 capsule (200 mg total) by mouth daily.       pregabalin 75 MG Oral Cap Take 1 capsule (75 mg total) by mouth daily.       sennosides-docusate 8.6-50 MG Oral Tab Take 1 tablet by mouth daily.       oxyCODONE 5 MG Oral Tab Take 0.5-1 tablets (2.5-5 mg total) by mouth every 4 (four) hours as needed for Pain.       traMADol 50 MG Oral Tab Take 1 tablet (50 mg total) by mouth every 4 to 6 hours as needed for Pain.       EPINEPHrine (EPIPEN 2-ALBERTO) 0.3 MG/0.3ML Injection Solution Auto-injector As directed 2 each 2    [3]   Past Surgical History:  Procedure Laterality Date    Back surgery  1/7/16    L4-S1 TLIF     Colonoscopy,biopsy  10/21/2010    hyperplastic polyp, Performed by BONNIE ASHFORD at [facility] [place], [facility]    Colonoscopy,biopsy N/A 12/17/2015    Procedure: COLONOSCOPY, POSSIBLE BIOPSY,  POSSIBLE POLYPECTOMY 79591;  Surgeon: Zachariah Cote MD;  Location: [facility], [facility]    Colonoscopy,diagnostic  7/8/05    hemorrhoids    Colonoscopy,diagnostic  2002    adenomatous polyp    Colonoscopy,diagnostic  12/17/15    Hyperplastic polyp    Colonoscopy,diagnostic  04/15/2021    ascending polyp    Cyst aspiration left  1990's    Other surgical history  1992    cyst aspiration on left breast   [4]   Social History  Socioeconomic History    Marital status:    Tobacco Use    Smoking status: Never    Smokeless tobacco: Never   Vaping Use    Vaping status: Never Used   Substance and Sexual Activity    Alcohol use: Yes     Alcohol/week: 0.0 standard drinks of alcohol     Comment: occ. 2 beers a month    Drug use: No    Sexual activity: Yes     Partners: Male

## 2025-07-24 NOTE — ANESTHESIA POSTPROCEDURE EVALUATION
[facility]    Shayna Luciacarlos Patient Status:  Outpatient in a Bed   Age/Gender 75 year old female MRN [id number]   Location [facility] POST ANESTHESIA CARE UNIT Attending Thai Penaloza MD   Hosp Day # 0 PCP Adam Manzo MD       Anesthesia Post-op Note    RIGHT ANTERIOR TOTAL HIP ARTHROPLASTY    Procedure Summary       Date: 07/24/25 Room / Location:  MAIN OR 11 /  MAIN OR    Anesthesia Start: 0704 Anesthesia Stop: 0940    Procedure: RIGHT ANTERIOR TOTAL HIP ARTHROPLASTY (Right: Hip) Diagnosis: (PRIMARY OSTEOARTHRITIS RIGHT HIP)    Surgeons: Thai Penaloza MD Anesthesiologist: Kian Slater MD    Anesthesia Type: general ASA Status: 2            Anesthesia Type: general    Vitals Value Taken Time   /77 07/24/25 13:22   Temp 98.7 °F (37.1 °C) 07/24/25 09:40   Pulse 93 07/24/25 13:23   Resp 15 07/24/25 13:23   SpO2 97 % 07/24/25 13:23   Vitals shown include unfiled device data.        Patient Location: PACU    Anesthesia Type: general    Airway Patency: patent    Postop Pain Control: adequate    Mental Status: preanesthetic baseline    Nausea/Vomiting: none    Cardiopulmonary/Hydration status: stable euvolemic    Complications: no apparent anesthesia related complications    Postop vital signs: stable    Dental Exam: Unchanged from Preop    Patient to be discharged from PACU when criteria met.

## 2025-07-25 VITALS
TEMPERATURE: 98 F | OXYGEN SATURATION: 94 % | DIASTOLIC BLOOD PRESSURE: 77 MMHG | BODY MASS INDEX: 39.66 KG/M2 | HEIGHT: 60 IN | SYSTOLIC BLOOD PRESSURE: 131 MMHG | WEIGHT: 202 LBS | RESPIRATION RATE: 16 BRPM | HEART RATE: 83 BPM

## 2025-07-25 LAB
ANION GAP SERPL CALC-SCNC: 10 MMOL/L (ref 0–18)
BASOPHILS # BLD AUTO: 0.01 X10(3) UL (ref 0–0.2)
BASOPHILS NFR BLD AUTO: 0.1 %
BUN BLD-MCNC: 14 MG/DL (ref 9–23)
CALCIUM BLD-MCNC: 8.8 MG/DL (ref 8.7–10.6)
CHLORIDE SERPL-SCNC: 102 MMOL/L (ref 98–112)
CO2 SERPL-SCNC: 28 MMOL/L (ref 21–32)
CREAT BLD-MCNC: 0.85 MG/DL (ref 0.55–1.02)
EGFRCR SERPLBLD CKD-EPI 2021: 72 ML/MIN/1.73M2 (ref 60–?)
EOSINOPHIL # BLD AUTO: 0.01 X10(3) UL (ref 0–0.7)
EOSINOPHIL NFR BLD AUTO: 0.1 %
ERYTHROCYTE [DISTWIDTH] IN BLOOD BY AUTOMATED COUNT: 15.4 %
GLUCOSE BLD-MCNC: 108 MG/DL (ref 70–99)
HCT VFR BLD AUTO: 30.5 % (ref 35–48)
HGB BLD-MCNC: 10.5 G/DL (ref 12–16)
IMM GRANULOCYTES # BLD AUTO: 0.05 X10(3) UL (ref 0–1)
IMM GRANULOCYTES NFR BLD: 0.5 %
LYMPHOCYTES # BLD AUTO: 1.39 X10(3) UL (ref 1–4)
LYMPHOCYTES NFR BLD AUTO: 12.6 %
MAGNESIUM SERPL-MCNC: 1.6 MG/DL (ref 1.6–2.6)
MCH RBC QN AUTO: 28.5 PG (ref 26–34)
MCHC RBC AUTO-ENTMCNC: 34.4 G/DL (ref 31–37)
MCV RBC AUTO: 82.7 FL (ref 80–100)
MONOCYTES # BLD AUTO: 1.29 X10(3) UL (ref 0.1–1)
MONOCYTES NFR BLD AUTO: 11.7 %
NEUTROPHILS # BLD AUTO: 8.32 X10 (3) UL (ref 1.5–7.7)
NEUTROPHILS # BLD AUTO: 8.32 X10(3) UL (ref 1.5–7.7)
NEUTROPHILS NFR BLD AUTO: 75 %
OSMOLALITY SERPL CALC.SUM OF ELEC: 291 MOSM/KG (ref 275–295)
PLATELET # BLD AUTO: 179 10(3)UL (ref 150–450)
POTASSIUM SERPL-SCNC: 3.1 MMOL/L (ref 3.5–5.1)
RBC # BLD AUTO: 3.69 X10(6)UL (ref 3.8–5.3)
SODIUM SERPL-SCNC: 140 MMOL/L (ref 136–145)
WBC # BLD AUTO: 11.1 X10(3) UL (ref 4–11)

## 2025-07-25 PROCEDURE — 85025 COMPLETE CBC W/AUTO DIFF WBC: CPT | Performed by: INTERNAL MEDICINE

## 2025-07-25 PROCEDURE — 83735 ASSAY OF MAGNESIUM: CPT | Performed by: INTERNAL MEDICINE

## 2025-07-25 PROCEDURE — 80048 BASIC METABOLIC PNL TOTAL CA: CPT | Performed by: INTERNAL MEDICINE

## 2025-07-25 PROCEDURE — 97165 OT EVAL LOW COMPLEX 30 MIN: CPT

## 2025-07-25 PROCEDURE — 97116 GAIT TRAINING THERAPY: CPT

## 2025-07-25 PROCEDURE — 97110 THERAPEUTIC EXERCISES: CPT

## 2025-07-25 PROCEDURE — 97530 THERAPEUTIC ACTIVITIES: CPT

## 2025-07-25 PROCEDURE — 97535 SELF CARE MNGMENT TRAINING: CPT

## 2025-07-25 RX ORDER — POLYETHYLENE GLYCOL 3350 17 G/17G
17 POWDER, FOR SOLUTION ORAL 2 TIMES DAILY PRN
Qty: 30 EACH | Refills: 0 | Status: SHIPPED | COMMUNITY
Start: 2025-07-25

## 2025-07-25 RX ORDER — DEXAMETHASONE 4 MG/1
4 TABLET ORAL ONCE
Status: COMPLETED | OUTPATIENT
Start: 2025-07-25 | End: 2025-07-25

## 2025-07-25 RX ORDER — POTASSIUM CHLORIDE 1500 MG/1
40 TABLET, EXTENDED RELEASE ORAL EVERY 4 HOURS
Status: COMPLETED | OUTPATIENT
Start: 2025-07-25 | End: 2025-07-25

## 2025-07-25 RX ORDER — MAGNESIUM OXIDE 400 MG/1
400 TABLET ORAL ONCE
Status: COMPLETED | OUTPATIENT
Start: 2025-07-25 | End: 2025-07-25

## 2025-07-25 NOTE — OCCUPATIONAL THERAPY NOTE
OCCUPATIONAL THERAPY EVALUATION - INPATIENT    Room Number: 373/373-A  Evaluation Date: 7/25/2025     Type of Evaluation: Initial  Presenting Problem: s/p right anterior total hip arthroplasty 7/24/25    Physician Order: IP Consult to Occupational Therapy  Reason for Therapy:  ADL/IADL Dysfunction and Discharge Planning    OCCUPATIONAL THERAPY ASSESSMENT   Patient is a 75 year old female admitted on 7/24/2025 with Presenting Problem: s/p right anterior total hip arthroplasty 7/24/25. Co-Morbidities : HTN, L knee OA, spinal fusion  Patient is currently functioning near baseline with BADLs and functional mobility.  Prior to admission, patient's baseline is independent.  Patient met all OT goals at modified independent level.  Patient reports no further questions/concerns at this time.     Recommendations for nursing staff:   Transfers: rw  Toileting location: Toilet    EVALUATION SESSION:  Patient at start of session: Up in chair     FUNCTIONAL TRANSFER ASSESSMENT  Sit to Stand: Edge of Bed; Chair  Edge of Bed: Modified Independent  Chair: Modified Independent  Toilet Transfer: Modified Independent    BED MOBILITY  Rolling: Modified Independent  Supine to Sit : Modified Independent  Sit to Supine (OT): Modified Independent  Scooting: Modified Independent    BALANCE ASSESSMENT  Static Sitting: Independent  Static Standing: Modified Independent  Dynamic Sitting: Independent   Dynamic Standing: Modified Independent   FUNCTIONAL ADL ASSESSMENT  UB Dressing Seated: Independent  LB Dressing Seated: Modified Independent  LB Dressing Standing: Modified Independent  Toileting Seated: Modified Independent    ACTIVITY TOLERANCE:   Pt was able to tolerate dressing task, toileting/pericare, and bed mobility task with no c/o SOB or LH.                          O2 SATURATIONS       COGNITION  Overall Cognitive Status:  WFL - within functional limits    COGNITION ASSESSMENTS     Upper Extremity:   ROM: within functional limits    Strength: is within functional limits   Coordination:  Gross motor: WFL  Fine motor: WFL  Sensation: Intact; no c/o numbness or tingling     EDUCATION PROVIDED  Patient Education : Role of Occupational Therapy; Plan of Care; Discharge Recommendations; Functional Transfer Techniques; Fall Prevention; Weight Bear Status; Energy Conservation; Proper Body Mechanics  Patient's Response to Education: Verbalized Understanding    Equipment used: gait belt + rw  Demonstrates functional use    Therapist comments:   Pt was very motivated throughout session. Pt mentioned feeling very sore compared to yesterday. Pt was getting dressed at the start of session. Pt reported no difficulties getting dressed. Pt ambulated to EOB using rw and demonstrated getting in and out of bed. When getting into bed, pt asked for assistance raising R leg up, but did not require assistance and completed task modified independent. Pt ambulated to bathroom using rw and completed pericare and transferring modified independent. Educated pt on the importance of remaining mobile. Pt verbalized understanding.     Patient End of Session: Up in chair, Needs met, Call light within reach, RN aware of session/findings, All patient questions and concerns addressed, Hospital anti-slip socks, SCDs in place, Ice applied, Alarm set    OCCUPATIONAL PROFILE    HOME SITUATION  Type of Home: House  Home Layout: Two level  Lives With: Spouse    Toilet and Equipment: Toilet riser with arms  Shower/Tub and Equipment: Tub-shower combo, Tub transfer bench  Other Equipment: None       Hand Dominance: Right  Drives: Yes       Prior Level of Function:   Pt lives with spouse in 2 story home. Pts daughter will be staying with her as well. Pt independent with ADL and mobility. Pt cares for young grandchildren. Pt reports working but recently quit d/t pain in her R hip.     SUBJECTIVE  \"I ordered this device to help me raise my R leg up.\"  \"I think I overdid it yesterday going  for 4 walks, I'm really sore today.\"    PAIN ASSESSMENT  Rating: Unable to rate  Location: R hip  Management Techniques: Activity promotion, Ice    OBJECTIVE  Precautions: Bed/chair alarm  Fall Risk: High fall risk    WEIGHT BEARING RESTRICTION  R Lower Extremity: Weight Bearing as Tolerated      AM-PAC ‘6-Clicks’ Inpatient Daily Activity Short Form  -   Putting on and taking off regular lower body clothing?: None  -   Bathing (including washing, rinsing, drying)?: None  -   Toileting, which includes using toilet, bedpan or urinal? : None  -   Putting on and taking off regular upper body clothing?: None  -   Taking care of personal grooming such as brushing teeth?: None  -   Eating meals?: None    AM-PAC Score:  Score: 24  Approx Degree of Impairment: 0%  Standardized Score (AM-PAC Scale): 57.54    ADDITIONAL TESTS     NEUROLOGICAL FINDINGS      PLAN   Patient has been evaluated and presents with no skilled Occupational Therapy needs at this time.  Patient discharged from Occupational Therapy services.  Please re-order if a new functional limitation presents during this admission.    OT Device Recommendations: None    Patient Evaluation Complexity Level:   Occupational Profile/Medical History LOW - Brief history including review of medical or therapy records    Specific performance deficits impacting engagement in ADL/IADL LOW  1 - 3 performance deficits    Client Assessment/Performance Deficits MODERATE - Comorbidities and min to mod modifications of tasks    Clinical Decision Making LOW - Analysis of occupational profile, problem-focused assessments, limited treatment options    Overall Complexity LOW     OT Session Time: 30 minutes  Self-Care Home Management: 10 minutes  Therapeutic Activity: 5 minutes

## 2025-07-25 NOTE — PROGRESS NOTES
Discharge AVS reviewed with patient. All questions answered. IV removed. Bedside belongings packed up and returned to patient.

## 2025-07-25 NOTE — PROGRESS NOTES
Progress Note    Patient: Shayna Owens  Medical record #: [id number]    Shayna Owens is a 75 year old  female who is POD #1 right MIGUEL.  The patient's main complaint today is surgical pain at the operative site. Denies paresthesias/CP/SOA. Patient has been working with physical therapy.      Hospital Problem List:  Active Problems:    * No active hospital problems. *      Current Medications:  Current Medications[1]      Input/Output:    Intake/Output Summary (Last 24 hours) at 7/25/2025 0829  Last data filed at 7/24/2025 0940  Gross per 24 hour   Intake 1700 ml   Output 150 ml   Net 1550 ml       Vital signs:  Blood pressure 131/77, pulse 83, temperature 97.6 °F (36.4 °C), temperature source Oral, resp. rate 16, height 5' (1.524 m), weight 202 lb (91.6 kg), SpO2 94%, not currently breastfeeding.    Physical Exam:     right Leg:  Dressing: clean and dry  Able to dorsiflex ankle and move toes  Sensation grossly intact to light touch throughout  Distal pulses intact  No calf swelling  Gabrielle's sign negative  Compartments soft  No signs or symptoms of DVT or compartment syndrome      Labs:   Lab Results   Component Value Date    WBC 11.1 07/25/2025    HGB 10.5 07/25/2025    HCT 30.5 07/25/2025    .0 07/25/2025    CREATSERUM 0.85 07/25/2025    BUN 14 07/25/2025     07/25/2025    K 3.1 07/25/2025     07/25/2025    CO2 28.0 07/25/2025     07/25/2025    CA 8.8 07/25/2025    MG 1.6 07/25/2025         Assessment: 75 year old  female POD #1 right MIGUEL    Plan:    Hgb - 10.5 - acute blood loss anemia, consistent with post op changes, stable/asymptomatic  Donna-op Glucose Goal control <140  TEDs, SCDs, IS  mobilize with PT, WBAT on operative leg  pain controlled with meds  According to CHEST and AAOS guidelines, ASA EC 81 mg po bid for DVT prophylaxis due to bleeding concerns  Disposition: plan discharge today if doing well with PT, consider home vs rehab if needed    Follow up with   Ca in 2 weeks    Followed by Physician group for medical conditions to include Active Problems:    * No active hospital problems. *        Signed:  Thai Penaloza MD  2025  8:29 AM         [1]    potassium chloride (Klor-Con M20) tab 40 mEq  40 mEq Oral Q4H    [COMPLETED] magnesium oxide (Mag-Ox) tab 400 mg  400 mg Oral Once    lactated ringers infusion   Intravenous Continuous    [COMPLETED] tranexamic acid in sodium chloride 0.7% (Cyklokapron) 1000 mg/100mL infusion premix 1,000 mg  1,000 mg Intravenous Once    [COMPLETED] ceFAZolin (Ancef) 2g in 10mL IV syringe premix  2 g Intravenous Once    [] ceFAZolin (Ancef) 2 g/10mL IV syringe premix        sodium chloride 0.9 % IV bolus 500 mL  500 mL Intravenous Once PRN    sennosides (Senokot) tab 17.2 mg  17.2 mg Oral Nightly    docusate sodium (Colace) cap 100 mg  100 mg Oral BID    polyethylene glycol (PEG 3350) (Miralax) 17 g oral packet 17 g  17 g Oral Daily PRN    magnesium hydroxide (Milk of Magnesia) 400 MG/5ML oral suspension 30 mL  30 mL Oral Daily PRN    bisacodyl (Dulcolax) 10 MG rectal suppository 10 mg  10 mg Rectal Daily PRN    fleet enema (Fleet) rectal enema 133 mL  1 enema Rectal Once PRN    ondansetron (Zofran) 4 MG/2ML injection 4 mg  4 mg Intravenous Q6H PRN    metoclopramide (Reglan) 5 mg/mL injection 10 mg  10 mg Intravenous Q8H PRN    famotidine (Pepcid) tab 20 mg  20 mg Oral BID    Or    famotidine (Pepcid) 20 mg/2mL injection 20 mg  20 mg Intravenous BID    [] diphenhydrAMINE (Benadryl) 50 mg/mL  injection 25 mg  25 mg Intravenous Once PRN    diphenhydrAMINE (Benadryl) cap/tab 25 mg  25 mg Oral Q4H PRN    Or    diphenhydrAMINE (Benadryl) 50 mg/mL  injection 12.5 mg  12.5 mg Intravenous Q4H PRN    ferrous sulfate DR tab 325 mg  325 mg Oral Daily with breakfast    aspirin DR tab 81 mg  81 mg Oral BID    [COMPLETED] ceFAZolin (Ancef) 2g in 10mL IV syringe premix  2 g Intravenous Q8H    tiZANidine (Zanaflex) partial tab 1 mg   1 mg Oral Q6H PRN    dexAMETHasone PF (Decadron) 10 MG/ML injection 8 mg  8 mg Intravenous Once    acetaminophen (Tylenol) tab 650 mg  650 mg Oral Q4H While awake    traMADol (Ultram) tab 50 mg  50 mg Oral 4 times per day    oxyCODONE immediate release partial tab 2.5 mg  2.5 mg Oral Q4H PRN    Or    oxyCODONE immediate release tab 5 mg  5 mg Oral Q4H PRN    HYDROmorphone (Dilaudid) 1 MG/ML injection 0.2 mg  0.2 mg Intravenous Q2H PRN    Or    HYDROmorphone (Dilaudid) 1 MG/ML injection 0.4 mg  0.4 mg Intravenous Q2H PRN    ketorolac (Toradol) 15 MG/ML injection 15 mg  15 mg Intravenous Q6H    [COMPLETED] metoclopramide (Reglan) 5 mg/mL injection 10 mg  10 mg Intravenous Once    [COMPLETED] metoclopramide (Reglan) 5 mg/mL injection        hydroCHLOROthiazide tab 50 mg  50 mg Oral Daily    losartan (Cozaar) tab 50 mg  50 mg Oral Daily    pravastatin (Pravachol) tab 20 mg  20 mg Oral Nightly    progesterone (Prometrium) cap 100 mg  100 mg Oral Nightly    [COMPLETED] povidone-iodine (Betadine) 10 % 17.5 mL in sodium chloride 0.9 % 500 mL irrigation   Irrigation Once (Intra-Op)    [COMPLETED] clonidine/epinephrine/ropivacaine/ketorolac in 0.9% NaCl 60 mL pain cocktail syringe for hip arthroplasty   Infiltration Once (Intra-Op)

## 2025-07-25 NOTE — PHYSICAL THERAPY NOTE
PHYSICAL THERAPY TREATMENT NOTE - INPATIENT    Room Number: 373/373-A     Session: 1     Number of Visits to Meet Established Goals: 4    Presenting Problem: s/p R MIGUEL 7/24/25  Co-Morbidities : HTN, L knee OA, spinal fusion    PHYSICAL THERAPY ASSESSMENT   Patient demonstrates good  progress this session, goals  updated to reflect patient performance.      Patient is requiring supervision and contact guard assist as a result of the following impairments: decreased functional strength, decreased endurance/aerobic capacity, pain, and decreased muscular endurance.     Patient continues to function below baseline with bed mobility, transfers, gait, stair negotiation, and performing household tasks.  Next session anticipate patient to progress transfers and gait.  Physical Therapy will continue to follow patient for duration of hospitalization.    Patient continues to benefit from continued skilled PT services: at discharge to promote prior level of function.  Anticipate patient will return home with home health PT.    PLAN DURING HOSPITALIZATION  Nursing Mobility Recommendation : 1 Assist  PT Device Recommendation: Rolling walker  PT Treatment Plan: Bed mobility, Endurance, Energy conservation, Patient education, Gait training, Balance training, Transfer training, Strengthening, Stair training, Range of motion  Frequency (Obs): Daily     CURRENT GOALS       Goal #1  Patient is able to demonstrate supine - sit EOB @ level: supervision      Goal #2  Patient is able to demonstrate transfers Sit to/from Stand at assistance level: supervision   Goal #3     Patient is able to ambulate 150 feet with assistive device at assistance level: supervision   Goal #4     Patient will negotiate 4 stairs/one curb w/ assistive device and supervision   Goal #5     Patient verbalizes and/or demonstrates all precautions and safety concerns independently    Goal #6        Goal Comments: Goals established on 7/24/2025 7/25/2025 all goals  ongoing    SUBJECTIVE  \"I have a plan B just in case. I am glad I don't have to wash my hair with the hose\"     OBJECTIVE  Precautions: Bed/chair alarm    WEIGHT BEARING RESTRICTION  R Lower Extremity: Weight Bearing as Tolerated    PAIN ASSESSMENT   Rating: Unable to rate  Location: R hip  Management Techniques: Activity promotion, Body mechanics, Breathing techniques, Relaxation, Repositioning    BALANCE                                                                                                                       Static Sitting: Good  Dynamic Sitting: Good           Static Standing: Fair -  Dynamic Standing: Fair -    ACTIVITY TOLERANCE                         O2 WALK       AM-PAC '6-Clicks' INPATIENT SHORT FORM - BASIC MOBILITY  How much difficulty does the patient currently have...  Patient Difficulty: Turning over in bed (including adjusting bedclothes, sheets and blankets)?: None   Patient Difficulty: Sitting down on and standing up from a chair with arms (e.g., wheelchair, bedside commode, etc.): None   Patient Difficulty: Moving from lying on back to sitting on the side of the bed?: A Little   How much help from another person does the patient currently need...   Help from Another: Moving to and from a bed to a chair (including a wheelchair)?: None   Help from Another: Need to walk in hospital room?: None   Help from Another: Climbing 3-5 steps with a railing?: A Little     AM-PAC Score:  Raw Score: 22   Approx Degree of Impairment: 20.91%   Standardized Score (AM-PAC Scale): 53.28   CMS Modifier (G-Code): CJ    FUNCTIONAL ABILITY STATUS  Gait Assessment   Functional Mobility/Gait Assessment  Gait Assistance: Contact guard assist, Supervision  Distance (ft): 150  Assistive Device: Rolling walker  Pattern: R Decreased stance time  Stairs: Stairs, Stoop/curb, Car transfer  How Many Stairs: 4  Device: 2 Rails  Assist: Contact guard assist  Pattern: Ascend and Descend  Ascend and Descend : Step  to  Stoop/Curb: CGA  Car transfer: supervision (using leg lift)    Skilled Therapy Provided  Pt presents in BS chair, daughter at BS .    Pt performed seated and standing therex per MIGUEL protocol. Pt gait trained c RW cues for reciprocal gait pattern, WBAT and proper integration of RW with good return demo.   Pt t/f trained as noted above c cues for sequencing.   Toilet t/f mod I to supervision, SBA at sink for hand hygiene.  Pt has  RW for home use. Pt left in chair, needs met. All questions and concerns addressed.        Bed Mobility:  Rolling:    Supine<>Sit:    Sit<>Supine:      Transfer Mobility:  Sit<>Stand: supervision   Stand<>Sit:  mod I    Gait: CGA to supervision c RW     Therapist's Comments: all questions and concerns addressed from daughter and pt       THERAPEUTIC EXERCISES  Lower Extremity Ankle pumps  Heel raises  Standing rocks, standing knee flexion, partial squats      Upper Extremity      Position Sitting & Standing     Repetitions   10   Sets   1     Patient End of Session: Up in chair, Needs met, Call light within reach, RN aware of session/findings, All patient questions and concerns addressed, Hospital anti-slip socks, Alarm set, Family present    PT Session Time: 38 minutes  Gait Training: 15 minutes  Therapeutic Activity: 15 minutes  Therapeutic Exercise: 8 minutes   Neuromuscular Re-education:  minutes

## 2025-07-25 NOTE — PLAN OF CARE
Pt A&Ox4, on RA, VSS. POD#1 of a R total hip. Aquacel C/D/I. Up w/ assistance. Voiding into the bathroom. Pt reporting minimal pain. Pain managed w/ scheduled and PRN medication (Refer to MAR). Plan: Pre-Op [facility], PT/OT eval, pain management. POC discussed w/ pt, all questions answered. Call light within reach, safety parameters in place.

## 2025-07-25 NOTE — PLAN OF CARE
23.4 Patient is alert and oriented x 4. Vitals stable on room air. Pain managed by prn meds. Denies numbness & tingling. Up min assist with walker. Plan to discharge home with [facility] once cleared.  Plan of care discussed with patient.

## 2025-07-25 NOTE — DISCHARGE SUMMARY
[facility] Hospitalist Discharge Summary     Patient ID:  Shayna Owens  75 year old  5/30/1950    Admission Date/Time  7/24/2025  5:18 AM  Discharge Date  07/25/25    PCP  Adam Manzo MD     Discharging Hospitalist:  Mehrdad Adams DO    Disposition:  good    Follow Up Appointments  Adam Manzo MD  [address]  [address]  [facility] [id number] [phone]    Schedule an appointment as soon as possible for a visit in 1 week(s)  1-2 weeks    Thai Penaloza MD  [address]  [address]  [facility] [address]  [phone]    Follow up in 2 week(s)  as directed      Primary Hospital Problems/Hospital Course Summary  Oa   S/p right neal     Medication Changes  Bowel regimen    Important Follow Up Items  Labs:   Incidental Findings:     Procedures/Diagnostics      Operative Procedures:   Right neal     Change in Code Status:       Hospital Course/Secondary Diagnoses:      Uncomplicated post op course after right anterior neal w/ nya  Aspirin for dvt ppx  Ortho and pcp f/u     Consults: IP CONSULT TO CASE MANAGEMENT  IP CONSULT TO HOSPITALIST  IP CONSULT TO RESPIRATORY CARE  IP CONSULT TO SOCIAL WORK    Discharge Medications       Medication List        START taking these medications      Polyethylene Glycol 3350 17 g Pack  Commonly known as: MIRALAX  Take 17 g by mouth 2 (two) times daily as needed.            CHANGE how you take these medications      Potassium Chloride ER 20 MEQ Tbcr  Take 1 tablet by mouth daily.  What changed: when to take this            CONTINUE taking these medications      acetaminophen 500 MG Tabs  Commonly known as: Tylenol Extra Strength     aspirin 81 MG Tbec     celecoxib 200 MG Caps  Commonly known as: CeleBREX     CLARITIN OR     Co-Enzyme Q10 100 MG Caps     EPINEPHrine 0.3 MG/0.3ML Soaj  Commonly known as: EpiPen 2-Taras  As directed     estradiol 0.05 MG/24HR Pttw  Commonly known as:  Bijan  Place 1 patch onto the skin twice a week.     Flax Seed Oil 1000 MG Caps     hydroCHLOROthiazide 50 MG Tabs  Take 1 tablet (50 mg total) by mouth daily.     Irbesartan 150 MG Tabs  Take 1 tablet (150 mg total) by mouth 2 (two) times daily.     Lovastatin 20 MG Tabs  TAKE 1 TABLET BY MOUTH EVERY NIGHT     * PRESERVISION AREDS 2 OR     * Multi-Vitamin/Minerals Tabs     oxyCODONE 5 MG Tabs     PATIENT SUPPLIED MEDICATION     pregabalin 75 MG Caps  Commonly known as: Lyrica     progesterone 100 MG Caps  Commonly known as: Prometrium     sennosides-docusate 8.6-50 MG Tabs  Commonly known as: Pericolace     traMADol 50 MG Tabs  Commonly known as: Ultram     Turmeric 5-1000 MG Caps     Vitamin D 1000 units Tabs           * This list has 2 medication(s) that are the same as other medications prescribed for you. Read the directions carefully, and ask your doctor or other care provider to review them with you.                   Where to Get Your Medications        Information about where to get these medications is not yet available    Ask your nurse or doctor about these medications  Polyethylene Glycol 3350 17 g Pack       I reconciled current and discharge medications on the day of discharge.    Imaging/Diagnostic Reports  XR PELVIS (1 VIEW) (CPT=72170)  Result Date: 7/24/2025  PROCEDURE: XR PELVIS (1 VIEW) (CPT=72170) INDICATIONS: s/p R MIGUEL - low AP pelvis only, if whole implant not included get AP hip as well PATIENT STATED HISTORY: Post right hip surgery. Patient denied having any immediate concerns about her recovery. COMPARISON: There are no comparisons for this exam.     CONCLUSION: Postoperative changes of right total hip arthroplasty without regional fracture, malalignment, or evidence of hardware complication. Small volume of regional soft tissue gas noted. Mild osteoarthritis of the left hip. Electronically Verified and Signed by Attending Radiologist: Lucita Marshall MD 7/24/2025 12:03 PM Workstation:  [id number]    XR FLUOROSCOPY C-ARM TIME LESS THAN 1 HOUR (CPT=76000)  Result Date: 7/24/2025  PROCEDURE: XR FLUOROSCOPY C-ARM TIME LESS THAN 1 HOUR (CPT=76000) INDICATIONS: right total hip replacement. PATIENT STATED HISTORY: COMPARISON: There are no comparisons for this exam. Fluoro/Cine Image Number: 6 images Fluoro Time: 48 seconds Radiation Dose: 7.4613 mGy Technologist Time: 1 hour 40 minutes     CONCLUSION: Multiple fluoroscopic images acquired during right total hip arthroplasty procedure. Please refer to dedicated procedure report for full details. Electronically Verified and Signed by Attending Radiologist: Lucita Marshall MD 7/24/2025 10:01 AM Workstation: [email or web address]        Patient instructions:      I as the attending physician reconciled the current and discharge medications on day of discharge.     Current Discharge Medication List        START taking these medications    Details   polyethylene glycol, PEG 3350, 17 g Oral Powd Pack Take 17 g by mouth 2 (two) times daily as needed.           CONTINUE these medications which have NOT CHANGED    Details   progesterone 100 MG Oral Cap Take 1 capsule (100 mg total) by mouth nightly.      Turmeric 5-1000 MG Oral Cap Take 2 capsules by mouth daily.      Co-Enzyme Q10 100 MG Oral Cap Take 400 mg by mouth daily.      PATIENT SUPPLIED MEDICATION Calcium 600 mg /takes 2 daily  Magnesium 150 mg/takes 2 daily  Collagen 1 scoop daily      Multiple Vitamins-Minerals (PRESERVISION AREDS 2 OR) Take 2 capsules by mouth daily.      LOVASTATIN 20 MG Oral Tab TAKE 1 TABLET BY MOUTH EVERY NIGHT      Potassium Chloride ER 20 MEQ Oral Tab CR Take 1 tablet by mouth daily.      hydroCHLOROthiazide 50 MG Oral Tab Take 1 tablet (50 mg total) by mouth daily.      Irbesartan 150 MG Oral Tab Take 1 tablet (150 mg total) by mouth 2 (two) times daily.      estradiol 0.05 MG/24HR Transdermal Patch Biweekly Place 1 patch onto the skin twice a week.      Loratadine (CLARITIN OR) Take 1  tablet by mouth in the morning.      Flaxseed, Linseed, (FLAX SEED OIL) 1000 MG Oral Cap Take 1 capsule by mouth in the morning.      VITAMIN D 1000 UNIT OR TABS Take 1 tablet by mouth daily.      MULTI-VITAMIN/MINERALS OR TABS Take 1 tablet by mouth daily.      acetaminophen 500 MG Oral Tab Take 2 tablets (1,000 mg total) by mouth every 8 (eight) hours. Post op      aspirin 81 MG Oral Tab EC Take 1 tablet (81 mg total) by mouth in the morning and 1 tablet (81 mg total) before bedtime. Post op. To take for 6 weeks for blood clot prevention.      celecoxib 200 MG Oral Cap Take 1 capsule (200 mg total) by mouth daily. Post op      pregabalin 75 MG Oral Cap Take 1 capsule (75 mg total) by mouth daily. Post op      sennosides-docusate 8.6-50 MG Oral Tab Take 1 tablet by mouth daily. Post op      oxyCODONE 5 MG Oral Tab Take 0.5-1 tablets (2.5-5 mg total) by mouth every 4 (four) hours as needed for Pain. Post op      traMADol 50 MG Oral Tab Take 1 tablet (50 mg total) by mouth every 4 to 6 hours as needed for Pain. Post op      EPINEPHrine (EPIPEN 2-ALBERTO) 0.3 MG/0.3ML Injection Solution Auto-injector As directed               Exam on day of discharge:     Vitals:    07/25/25 0735   BP: 131/77   Pulse: 83   Resp: 16   Temp: 97.6 °F (36.4 °C)       Physical Exam:   General: no acute distress  Heart: RRR  Lungs: CTAB  Abd: Soft, NT, ND  Neuro: no focal deficits      Total time coordinating care for discharge: Greater than 30 minutes    Mehrdad Adams DO

## (undated) DEVICE — Device

## (undated) DEVICE — SYRINGE MED 30ML STD CLR PLAS LL TIP N CTRL

## (undated) DEVICE — SUT COAT VCRL 0 27IN CP-1 ABSRB UD 36MM 1/2

## (undated) DEVICE — PACK ANTERIOR HIP

## (undated) DEVICE — GLOVE SUR 6.5 SENSICARE PI PIP GRN PWD F

## (undated) DEVICE — WRAP HIP COMPR

## (undated) DEVICE — GLOVE SUR 7 SENSICARE PI PIP CRM PWD F

## (undated) DEVICE — SUT MCRYL 3-0 27IN ABSRB UD 19MM PS-2 3/8

## (undated) DEVICE — SOLUTION IRRIG 3000ML 0.9% NACL FLX CONT

## (undated) DEVICE — GLOVE SUR 8 SENSICARE PI PIP CRM PWD F

## (undated) DEVICE — DRAPE TOP 102X53IN ABSRB REINF HK AND LOOP LN

## (undated) DEVICE — NEEDLE SPNL 18GA L3.5IN PNK QNCKE STYL DISP

## (undated) DEVICE — HOOD STERI-SHIELD 408-800-100

## (undated) DEVICE — POUCH IRRIG 19X23IN TRNSLUC MATTE FNSH

## (undated) DEVICE — ANTISEPTIC 4OZ 70% ISO ALC

## (undated) DEVICE — DRAPE SUR W70XL100IN STD SMS POLYPR FULL SHT

## (undated) DEVICE — GLOVE SUR 6.5 SENSICARE PI PIP CRM PWD F

## (undated) DEVICE — SUT FBRWR 2 38IN N ABSRB BLU L26.5MM 1/2

## (undated) DEVICE — GLOVE SUR 7.5 SENSICARE PI PIP GRN PWD F

## (undated) DEVICE — SEALER BPLR ELECTRD 3.48X5.74MM CNTR

## (undated) DEVICE — SUT STRATAFIX SYMMTRC PDS+ 1 18IN CTX ABSRB V

## (undated) DEVICE — COVER LT HNDL RIG FOR SUR CAM DISP